# Patient Record
Sex: FEMALE | Race: BLACK OR AFRICAN AMERICAN | NOT HISPANIC OR LATINO | Employment: OTHER | URBAN - METROPOLITAN AREA
[De-identification: names, ages, dates, MRNs, and addresses within clinical notes are randomized per-mention and may not be internally consistent; named-entity substitution may affect disease eponyms.]

---

## 2017-01-09 DIAGNOSIS — I11.9 BENIGN HYPERTENSIVE HEART DISEASE WITHOUT CONGESTIVE HEART FAILURE: ICD-10-CM

## 2017-01-09 DIAGNOSIS — F32.2 MAJOR DEPRESSIVE DISORDER, SINGLE EPISODE, SEVERE WITHOUT PSYCHOTIC FEATURES: ICD-10-CM

## 2017-01-09 RX ORDER — METOPROLOL TARTRATE 100 MG/1
TABLET ORAL
Qty: 60 TABLET | Refills: 0 | Status: SHIPPED | OUTPATIENT
Start: 2017-01-09 | End: 2017-02-07 | Stop reason: SDUPTHER

## 2017-01-10 RX ORDER — PAROXETINE HYDROCHLORIDE 20 MG/1
TABLET, FILM COATED ORAL
Qty: 90 TABLET | Refills: 0 | Status: SHIPPED | OUTPATIENT
Start: 2017-01-10 | End: 2017-02-07 | Stop reason: SDUPTHER

## 2017-01-10 RX ORDER — GABAPENTIN 300 MG/1
CAPSULE ORAL
Qty: 180 CAPSULE | Refills: 0 | Status: SHIPPED | OUTPATIENT
Start: 2017-01-10

## 2017-02-06 DIAGNOSIS — I11.9 BENIGN HYPERTENSIVE HEART DISEASE WITHOUT HEART FAILURE: ICD-10-CM

## 2017-02-06 RX ORDER — AMLODIPINE BESYLATE 10 MG/1
10 TABLET ORAL DAILY
Qty: 30 TABLET | Refills: 0 | Status: SHIPPED | OUTPATIENT
Start: 2017-02-06 | End: 2017-02-07 | Stop reason: SDUPTHER

## 2017-02-07 ENCOUNTER — OFFICE VISIT (OUTPATIENT)
Dept: FAMILY MEDICINE | Facility: CLINIC | Age: 82
End: 2017-02-07
Payer: MEDICARE

## 2017-02-07 VITALS
BODY MASS INDEX: 29.86 KG/M2 | HEART RATE: 56 BPM | TEMPERATURE: 98 F | DIASTOLIC BLOOD PRESSURE: 70 MMHG | RESPIRATION RATE: 17 BRPM | SYSTOLIC BLOOD PRESSURE: 158 MMHG | HEIGHT: 67 IN | OXYGEN SATURATION: 97 % | WEIGHT: 190.25 LBS

## 2017-02-07 DIAGNOSIS — I63.9 ISCHEMIC CEREBROVASCULAR ACCIDENT (CVA) OF FRONTAL LOBE: ICD-10-CM

## 2017-02-07 DIAGNOSIS — F32.2 MAJOR DEPRESSIVE DISORDER, SINGLE EPISODE, SEVERE WITHOUT PSYCHOTIC FEATURES: ICD-10-CM

## 2017-02-07 DIAGNOSIS — E78.5 HYPERLIPIDEMIA, UNSPECIFIED HYPERLIPIDEMIA TYPE: ICD-10-CM

## 2017-02-07 DIAGNOSIS — N18.4 TYPE 2 DIABETES MELLITUS WITH STAGE 4 CHRONIC KIDNEY DISEASE, WITH LONG-TERM CURRENT USE OF INSULIN: ICD-10-CM

## 2017-02-07 DIAGNOSIS — E11.22 TYPE 2 DIABETES MELLITUS WITH STAGE 4 CHRONIC KIDNEY DISEASE, WITH LONG-TERM CURRENT USE OF INSULIN: ICD-10-CM

## 2017-02-07 DIAGNOSIS — N18.4 CHRONIC KIDNEY DISEASE, STAGE IV (SEVERE): Primary | ICD-10-CM

## 2017-02-07 DIAGNOSIS — Z79.4 TYPE 2 DIABETES MELLITUS WITH STAGE 4 CHRONIC KIDNEY DISEASE, WITH LONG-TERM CURRENT USE OF INSULIN: ICD-10-CM

## 2017-02-07 DIAGNOSIS — E03.9 HYPOTHYROIDISM, UNSPECIFIED TYPE: ICD-10-CM

## 2017-02-07 DIAGNOSIS — I11.9 BENIGN HYPERTENSIVE HEART DISEASE WITHOUT HEART FAILURE: ICD-10-CM

## 2017-02-07 DIAGNOSIS — I11.9 BENIGN HYPERTENSIVE HEART DISEASE WITHOUT CONGESTIVE HEART FAILURE: ICD-10-CM

## 2017-02-07 PROCEDURE — 1157F ADVNC CARE PLAN IN RCRD: CPT | Mod: S$GLB,,, | Performed by: FAMILY MEDICINE

## 2017-02-07 PROCEDURE — 1126F AMNT PAIN NOTED NONE PRSNT: CPT | Mod: S$GLB,,, | Performed by: FAMILY MEDICINE

## 2017-02-07 PROCEDURE — 1159F MED LIST DOCD IN RCRD: CPT | Mod: S$GLB,,, | Performed by: FAMILY MEDICINE

## 2017-02-07 PROCEDURE — 90662 IIV NO PRSV INCREASED AG IM: CPT | Mod: S$GLB,,, | Performed by: FAMILY MEDICINE

## 2017-02-07 PROCEDURE — 99499 UNLISTED E&M SERVICE: CPT | Mod: S$PBB,,, | Performed by: FAMILY MEDICINE

## 2017-02-07 PROCEDURE — 3078F DIAST BP <80 MM HG: CPT | Mod: S$GLB,,, | Performed by: FAMILY MEDICINE

## 2017-02-07 PROCEDURE — 90670 PCV13 VACCINE IM: CPT | Mod: S$GLB,,, | Performed by: FAMILY MEDICINE

## 2017-02-07 PROCEDURE — G0008 ADMIN INFLUENZA VIRUS VAC: HCPCS | Mod: S$GLB,,, | Performed by: FAMILY MEDICINE

## 2017-02-07 PROCEDURE — 99999 PR PBB SHADOW E&M-EST. PATIENT-LVL III: CPT | Mod: PBBFAC,,, | Performed by: FAMILY MEDICINE

## 2017-02-07 PROCEDURE — 99214 OFFICE O/P EST MOD 30 MIN: CPT | Mod: 25,S$GLB,, | Performed by: FAMILY MEDICINE

## 2017-02-07 PROCEDURE — 3077F SYST BP >= 140 MM HG: CPT | Mod: S$GLB,,, | Performed by: FAMILY MEDICINE

## 2017-02-07 PROCEDURE — 1160F RVW MEDS BY RX/DR IN RCRD: CPT | Mod: S$GLB,,, | Performed by: FAMILY MEDICINE

## 2017-02-07 PROCEDURE — G0009 ADMIN PNEUMOCOCCAL VACCINE: HCPCS | Mod: S$GLB,,, | Performed by: FAMILY MEDICINE

## 2017-02-07 RX ORDER — AMLODIPINE BESYLATE 10 MG/1
10 TABLET ORAL DAILY
Qty: 90 TABLET | Refills: 1 | Status: SHIPPED | OUTPATIENT
Start: 2017-02-07 | End: 2017-03-13 | Stop reason: ALTCHOICE

## 2017-02-07 RX ORDER — LEVOTHYROXINE SODIUM 50 UG/1
50 TABLET ORAL
Qty: 90 TABLET | Refills: 1 | Status: SHIPPED | OUTPATIENT
Start: 2017-02-07

## 2017-02-07 RX ORDER — METOPROLOL TARTRATE 100 MG/1
100 TABLET ORAL 2 TIMES DAILY
Qty: 180 TABLET | Refills: 1 | Status: SHIPPED | OUTPATIENT
Start: 2017-02-07 | End: 2017-04-05 | Stop reason: SDUPTHER

## 2017-02-07 RX ORDER — PAROXETINE HYDROCHLORIDE 20 MG/1
20 TABLET, FILM COATED ORAL EVERY MORNING
Qty: 90 TABLET | Refills: 1 | Status: SHIPPED | OUTPATIENT
Start: 2017-02-07

## 2017-02-07 RX ORDER — INSULIN GLARGINE 100 [IU]/ML
20 INJECTION, SOLUTION SUBCUTANEOUS NIGHTLY
Qty: 15 ML | Refills: 5 | Status: SHIPPED | OUTPATIENT
Start: 2017-02-07

## 2017-02-07 RX ORDER — PRAVASTATIN SODIUM 20 MG/1
20 TABLET ORAL DAILY
Qty: 90 TABLET | Refills: 1 | Status: SHIPPED | OUTPATIENT
Start: 2017-02-07

## 2017-02-07 RX ORDER — POTASSIUM CHLORIDE 750 MG/1
TABLET, EXTENDED RELEASE ORAL
Qty: 90 TABLET | Refills: 1 | Status: SHIPPED | OUTPATIENT
Start: 2017-02-07

## 2017-02-07 RX ORDER — VALSARTAN 160 MG/1
160 TABLET ORAL DAILY
Qty: 90 TABLET | Refills: 1 | Status: SHIPPED | OUTPATIENT
Start: 2017-02-07

## 2017-02-07 NOTE — PROGRESS NOTES
"Subjective:       Patient ID: Maria D Rodrigues is a 85 y.o. female.    Chief Complaint: Follow-up (hypertension)    Diabetes   She presents for her follow-up diabetic visit. She has type 2 diabetes mellitus. Her disease course has been stable. There are no hypoglycemic associated symptoms. Pertinent negatives for hypoglycemia include no dizziness or headaches. Pertinent negatives for diabetes include no chest pain and no fatigue. There are no hypoglycemic complications. Symptoms are stable. Current diabetic treatment includes insulin injections. She is following a generally unhealthy (eats small amounts but eats yaneth cake and cokes. ) diet. Her lunch blood glucose range is generally 180-200 mg/dl. An ACE inhibitor/angiotensin II receptor blocker is being taken. Eye exam is current.   KaterinaDidi Killian is pleasant elderly lady with insulin dependent diabetes, ckd, stage 4, hypertension , hypothyroidism,  goes between her home and her daughter, Marleni and her own home. She does give her own insulin, but her daughter in law comes to give shots sometimes. I have expressed concerns about this due to her dementia.         Review of Systems   Constitutional: Negative for fatigue.   Respiratory: Negative for cough, chest tightness and shortness of breath.    Cardiovascular: Negative for chest pain, palpitations and leg swelling.   Gastrointestinal: Negative for abdominal pain.   Neurological: Negative for dizziness, syncope, light-headedness and headaches.       Objective:       Vitals:    02/07/17 1110   BP: (!) 158/70   Pulse: (!) 56   Resp: 17   Temp: 98.1 °F (36.7 °C)   TempSrc: Oral   SpO2: 97%   Weight: 86.3 kg (190 lb 4.1 oz)   Height: 5' 7" (1.702 m)       Physical Exam   Constitutional: She is oriented to person, place, and time. She appears well-developed and well-nourished. No distress.   HENT:   Head: Normocephalic and atraumatic.   Eyes: Conjunctivae are normal.   Neck: Normal range of motion. Neck supple. Carotid " bruit is not present.   Cardiovascular: Normal rate, regular rhythm and normal heart sounds.  Exam reveals no gallop and no friction rub.    No murmur heard.  Pulmonary/Chest: Effort normal and breath sounds normal. No respiratory distress. She has no wheezes. She has no rales.   Musculoskeletal: She exhibits no edema.   Neurological: She is alert and oriented to person, place, and time.   Skin: She is not diaphoretic.       Assessment:       1. Chronic kidney disease, stage IV (severe)    2. Type 2 diabetes mellitus with stage 4 chronic kidney disease, with long-term current use of insulin    3. Ischemic cerebrovascular accident (CVA) of frontal lobe    4. Benign hypertensive heart disease without congestive heart failure    5. Benign hypertensive heart disease without heart failure    6. Major depressive disorder, single episode, severe without psychotic features    7. Hypothyroidism, unspecified type    8. Hyperlipidemia, unspecified hyperlipidemia type        Plan:       Maria D ZIMMERMAN was seen today for follow-up.    Diagnoses and all orders for this visit:    Chronic kidney disease, stage IV (severe)  -     Comprehensive metabolic panel; Future  -     TSH; Future  -     CBC auto differential; Future  -     PTH, intact; Future  -     Vitamin D; Future    Type 2 diabetes mellitus with stage 4 chronic kidney disease, with long-term current use of insulin  -     Comprehensive metabolic panel; Future  -     Hemoglobin A1c; Future  -     Lipid panel; Future  -     Microalbumin/creatinine urine ratio; Future  -     TSH; Future  -     insulin glargine (LANTUS SOLOSTAR) 100 unit/mL (3 mL) InPn pen; Inject 20 Units into the skin every evening.  Due for labs and refilled lantus. Will adjust as needed    Ischemic cerebrovascular accident (CVA) of frontal lobe  Stable. No new symptoms. On aspirin alone as she is a fall risk      Benign hypertensive heart disease without congestive heart failure  -     metoprolol tartrate  (LOPRESSOR) 100 MG tablet; Take 1 tablet (100 mg total) by mouth 2 (two) times daily.  Stable. Refilled meds.     Benign hypertensive heart disease without heart failure  -     valsartan (DIOVAN) 160 MG tablet; Take 1 tablet (160 mg total) by mouth once daily.  -     amlodipine (NORVASC) 10 MG tablet; Take 1 tablet (10 mg total) by mouth once daily.    Major depressive disorder, single episode, severe without psychotic features  -     paroxetine (PAXIL) 20 MG tablet; Take 1 tablet (20 mg total) by mouth every morning.  Stable. Refilled meds.     Hypothyroidism, unspecified type  -     levothyroxine (SYNTHROID) 50 MCG tablet; Take 1 tablet (50 mcg total) by mouth before breakfast.  Stable and controlled. Continue current treatment plan as previously prescribed.     Hyperlipidemia, unspecified hyperlipidemia type  -     pravastatin (PRAVACHOL) 20 MG tablet; Take 1 tablet (20 mg total) by mouth once daily.    Other orders  -     Influenza - High Dose (65+) (PF) (IM)  -     Pneumococcal Conjugate Vaccine (13 Valent) (IM)  -     potassium chloride SA (K-DUR,KLOR-CON) 10 MEQ tablet; TAKE ONE TABLET BY MOUTH ONCE DAILY FOR POTASSIUM

## 2017-03-01 ENCOUNTER — HOSPITAL ENCOUNTER (OUTPATIENT)
Facility: HOSPITAL | Age: 82
Discharge: HOME HEALTH | End: 2017-03-02
Attending: EMERGENCY MEDICINE | Admitting: HOSPITALIST
Payer: MEDICARE

## 2017-03-01 DIAGNOSIS — E03.9 HYPOTHYROIDISM, UNSPECIFIED TYPE: Chronic | ICD-10-CM

## 2017-03-01 DIAGNOSIS — E86.0 DEHYDRATION: ICD-10-CM

## 2017-03-01 DIAGNOSIS — E87.0 HYPERNATREMIA: ICD-10-CM

## 2017-03-01 DIAGNOSIS — E78.5 HYPERLIPIDEMIA, UNSPECIFIED HYPERLIPIDEMIA TYPE: Chronic | ICD-10-CM

## 2017-03-01 DIAGNOSIS — I11.9 BENIGN HYPERTENSIVE HEART DISEASE WITHOUT HEART FAILURE: ICD-10-CM

## 2017-03-01 DIAGNOSIS — N18.4 CKD (CHRONIC KIDNEY DISEASE), STAGE IV: Chronic | ICD-10-CM

## 2017-03-01 DIAGNOSIS — J10.1 INFLUENZA A: Primary | ICD-10-CM

## 2017-03-01 DIAGNOSIS — R41.82 ALTERED MENTAL STATUS, UNSPECIFIED ALTERED MENTAL STATUS TYPE: ICD-10-CM

## 2017-03-01 DIAGNOSIS — I10 HYPERTENSION: ICD-10-CM

## 2017-03-01 LAB
ALBUMIN SERPL BCP-MCNC: 3.5 G/DL
ALLENS TEST: ABNORMAL
ALP SERPL-CCNC: 126 U/L
ALT SERPL W/O P-5'-P-CCNC: 24 U/L
AMORPH CRY URNS QL MICRO: ABNORMAL
ANION GAP SERPL CALC-SCNC: 14 MMOL/L
AST SERPL-CCNC: 30 U/L
B-OH-BUTYR BLD STRIP-SCNC: 1.7 MMOL/L
BACTERIA #/AREA URNS HPF: ABNORMAL /HPF
BASOPHILS # BLD AUTO: 0.04 K/UL
BASOPHILS NFR BLD: 0.4 %
BILIRUB SERPL-MCNC: 0.6 MG/DL
BILIRUB UR QL STRIP: NEGATIVE
BUN SERPL-MCNC: 24 MG/DL
CALCIUM SERPL-MCNC: 10.3 MG/DL
CHLORIDE SERPL-SCNC: 106 MMOL/L
CLARITY UR: ABNORMAL
CO2 SERPL-SCNC: 27 MMOL/L
COLOR UR: YELLOW
CREAT SERPL-MCNC: 2.3 MG/DL
DIFFERENTIAL METHOD: ABNORMAL
EOSINOPHIL # BLD AUTO: 0.1 K/UL
EOSINOPHIL NFR BLD: 1.4 %
ERYTHROCYTE [DISTWIDTH] IN BLOOD BY AUTOMATED COUNT: 15.1 %
EST. GFR  (AFRICAN AMERICAN): 22 ML/MIN/1.73 M^2
EST. GFR  (NON AFRICAN AMERICAN): 19 ML/MIN/1.73 M^2
FLUAV AG SPEC QL IA: POSITIVE
FLUBV AG SPEC QL IA: NEGATIVE
GLUCOSE SERPL-MCNC: 473 MG/DL
GLUCOSE UR QL STRIP: ABNORMAL
HCO3 UR-SCNC: 27 MMOL/L (ref 24–28)
HCT VFR BLD AUTO: 44.8 %
HCT VFR BLD CALC: 42 %PCV (ref 36–54)
HGB BLD-MCNC: 15 G/DL
HGB UR QL STRIP: ABNORMAL
HYALINE CASTS #/AREA URNS LPF: 0 /LPF
KETONES UR QL STRIP: ABNORMAL
LEUKOCYTE ESTERASE UR QL STRIP: NEGATIVE
LIPASE SERPL-CCNC: 18 U/L
LYMPHOCYTES # BLD AUTO: 1.5 K/UL
LYMPHOCYTES NFR BLD: 16.5 %
MAGNESIUM SERPL-MCNC: 2.1 MG/DL
MCH RBC QN AUTO: 31.8 PG
MCHC RBC AUTO-ENTMCNC: 33.5 %
MCV RBC AUTO: 95 FL
MICROSCOPIC COMMENT: ABNORMAL
MONOCYTES # BLD AUTO: 1.4 K/UL
MONOCYTES NFR BLD: 15.3 %
NEUTROPHILS # BLD AUTO: 6.2 K/UL
NEUTROPHILS NFR BLD: 66.4 %
NITRITE UR QL STRIP: NEGATIVE
PCO2 BLDA: 42.9 MMHG (ref 35–45)
PH SMN: 7.41 [PH] (ref 7.35–7.45)
PH UR STRIP: 6 [PH] (ref 5–8)
PHOSPHATE SERPL-MCNC: 2.5 MG/DL
PLATELET # BLD AUTO: 215 K/UL
PMV BLD AUTO: 11.2 FL
PO2 BLDA: 30 MMHG (ref 40–60)
POC BE: 2 MMOL/L
POC IONIZED CALCIUM: 1.22 MMOL/L (ref 1.06–1.42)
POC SATURATED O2: 58 % (ref 95–100)
POC TCO2: 28 MMOL/L (ref 24–29)
POCT GLUCOSE: 387 MG/DL (ref 70–110)
POCT GLUCOSE: 391 MG/DL (ref 70–110)
POCT GLUCOSE: 396 MG/DL (ref 70–110)
POCT GLUCOSE: 400 MG/DL (ref 70–110)
POCT GLUCOSE: 495 MG/DL (ref 70–110)
POTASSIUM BLD-SCNC: 3.1 MMOL/L (ref 3.5–5.1)
POTASSIUM SERPL-SCNC: 3.2 MMOL/L
PROT SERPL-MCNC: 9.5 G/DL
PROT UR QL STRIP: ABNORMAL
RBC # BLD AUTO: 4.71 M/UL
RBC #/AREA URNS HPF: 2 /HPF (ref 0–4)
SAMPLE: ABNORMAL
SITE: ABNORMAL
SODIUM BLD-SCNC: 148 MMOL/L (ref 136–145)
SODIUM SERPL-SCNC: 147 MMOL/L
SP GR UR STRIP: 1.02 (ref 1–1.03)
SPECIMEN SOURCE: ABNORMAL
URN SPEC COLLECT METH UR: ABNORMAL
UROBILINOGEN UR STRIP-ACNC: NEGATIVE EU/DL
WBC # BLD AUTO: 9.32 K/UL
WBC #/AREA URNS HPF: 8 /HPF (ref 0–5)
WBC CLUMPS URNS QL MICRO: ABNORMAL
YEAST URNS QL MICRO: ABNORMAL

## 2017-03-01 PROCEDURE — 81000 URINALYSIS NONAUTO W/SCOPE: CPT

## 2017-03-01 PROCEDURE — 85025 COMPLETE CBC W/AUTO DIFF WBC: CPT

## 2017-03-01 PROCEDURE — 87400 INFLUENZA A/B EACH AG IA: CPT | Mod: 59

## 2017-03-01 PROCEDURE — 83690 ASSAY OF LIPASE: CPT

## 2017-03-01 PROCEDURE — 99291 CRITICAL CARE FIRST HOUR: CPT | Mod: 25

## 2017-03-01 PROCEDURE — 87088 URINE BACTERIA CULTURE: CPT

## 2017-03-01 PROCEDURE — 84100 ASSAY OF PHOSPHORUS: CPT

## 2017-03-01 PROCEDURE — 82803 BLOOD GASES ANY COMBINATION: CPT

## 2017-03-01 PROCEDURE — 25000003 PHARM REV CODE 250: Performed by: EMERGENCY MEDICINE

## 2017-03-01 PROCEDURE — 83735 ASSAY OF MAGNESIUM: CPT

## 2017-03-01 PROCEDURE — 25000003 PHARM REV CODE 250: Performed by: INTERNAL MEDICINE

## 2017-03-01 PROCEDURE — P9612 CATHETERIZE FOR URINE SPEC: HCPCS

## 2017-03-01 PROCEDURE — 87040 BLOOD CULTURE FOR BACTERIA: CPT | Mod: 59

## 2017-03-01 PROCEDURE — 63600175 PHARM REV CODE 636 W HCPCS: Performed by: EMERGENCY MEDICINE

## 2017-03-01 PROCEDURE — 87186 SC STD MICRODIL/AGAR DIL: CPT | Mod: 59

## 2017-03-01 PROCEDURE — G0378 HOSPITAL OBSERVATION PER HR: HCPCS

## 2017-03-01 PROCEDURE — 93005 ELECTROCARDIOGRAM TRACING: CPT

## 2017-03-01 PROCEDURE — 87086 URINE CULTURE/COLONY COUNT: CPT

## 2017-03-01 PROCEDURE — 96361 HYDRATE IV INFUSION ADD-ON: CPT

## 2017-03-01 PROCEDURE — 25000003 PHARM REV CODE 250: Performed by: PHYSICIAN ASSISTANT

## 2017-03-01 PROCEDURE — 63600175 PHARM REV CODE 636 W HCPCS: Performed by: INTERNAL MEDICINE

## 2017-03-01 PROCEDURE — 99900035 HC TECH TIME PER 15 MIN (STAT)

## 2017-03-01 PROCEDURE — 63600175 PHARM REV CODE 636 W HCPCS: Performed by: PHYSICIAN ASSISTANT

## 2017-03-01 PROCEDURE — 96374 THER/PROPH/DIAG INJ IV PUSH: CPT

## 2017-03-01 PROCEDURE — 87077 CULTURE AEROBIC IDENTIFY: CPT | Mod: 59

## 2017-03-01 PROCEDURE — 80053 COMPREHEN METABOLIC PANEL: CPT

## 2017-03-01 PROCEDURE — 82010 KETONE BODYS QUAN: CPT

## 2017-03-01 PROCEDURE — 82962 GLUCOSE BLOOD TEST: CPT

## 2017-03-01 RX ORDER — GLUCAGON 1 MG
1 KIT INJECTION
Status: DISCONTINUED | OUTPATIENT
Start: 2017-03-01 | End: 2017-03-02 | Stop reason: HOSPADM

## 2017-03-01 RX ORDER — SODIUM CHLORIDE 9 MG/ML
INJECTION, SOLUTION INTRAVENOUS CONTINUOUS
Status: DISCONTINUED | OUTPATIENT
Start: 2017-03-01 | End: 2017-03-02 | Stop reason: HOSPADM

## 2017-03-01 RX ORDER — OSELTAMIVIR PHOSPHATE 75 MG/1
75 CAPSULE ORAL
Status: COMPLETED | OUTPATIENT
Start: 2017-03-01 | End: 2017-03-01

## 2017-03-01 RX ORDER — OLANZAPINE 10 MG/2ML
10 INJECTION, POWDER, FOR SOLUTION INTRAMUSCULAR ONCE
Status: COMPLETED | OUTPATIENT
Start: 2017-03-01 | End: 2017-03-01

## 2017-03-01 RX ORDER — ONDANSETRON 2 MG/ML
4 INJECTION INTRAMUSCULAR; INTRAVENOUS EVERY 12 HOURS PRN
Status: DISCONTINUED | OUTPATIENT
Start: 2017-03-01 | End: 2017-03-02 | Stop reason: HOSPADM

## 2017-03-01 RX ORDER — ASPIRIN 81 MG/1
81 TABLET ORAL DAILY
Status: DISCONTINUED | OUTPATIENT
Start: 2017-03-02 | End: 2017-03-02 | Stop reason: HOSPADM

## 2017-03-01 RX ORDER — METOPROLOL TARTRATE 50 MG/1
100 TABLET ORAL 2 TIMES DAILY
Status: DISCONTINUED | OUTPATIENT
Start: 2017-03-01 | End: 2017-03-02 | Stop reason: HOSPADM

## 2017-03-01 RX ORDER — PAROXETINE HYDROCHLORIDE 20 MG/1
20 TABLET, FILM COATED ORAL EVERY MORNING
Status: DISCONTINUED | OUTPATIENT
Start: 2017-03-02 | End: 2017-03-02

## 2017-03-01 RX ORDER — LEVOTHYROXINE SODIUM 50 UG/1
50 TABLET ORAL
Status: DISCONTINUED | OUTPATIENT
Start: 2017-03-02 | End: 2017-03-02 | Stop reason: HOSPADM

## 2017-03-01 RX ORDER — CLONIDINE HYDROCHLORIDE 0.1 MG/1
0.1 TABLET ORAL 3 TIMES DAILY PRN
Status: DISCONTINUED | OUTPATIENT
Start: 2017-03-01 | End: 2017-03-02 | Stop reason: HOSPADM

## 2017-03-01 RX ORDER — ACETAMINOPHEN 325 MG/1
650 TABLET ORAL EVERY 6 HOURS PRN
Status: DISCONTINUED | OUTPATIENT
Start: 2017-03-01 | End: 2017-03-02 | Stop reason: HOSPADM

## 2017-03-01 RX ORDER — SODIUM CHLORIDE 9 MG/ML
1000 INJECTION, SOLUTION INTRAVENOUS
Status: COMPLETED | OUTPATIENT
Start: 2017-03-01 | End: 2017-03-01

## 2017-03-01 RX ORDER — OSELTAMIVIR PHOSPHATE 75 MG/1
75 CAPSULE ORAL DAILY
Status: DISCONTINUED | OUTPATIENT
Start: 2017-03-02 | End: 2017-03-02 | Stop reason: HOSPADM

## 2017-03-01 RX ORDER — INSULIN ASPART 100 [IU]/ML
1-12 INJECTION, SOLUTION INTRAVENOUS; SUBCUTANEOUS
Status: DISCONTINUED | OUTPATIENT
Start: 2017-03-02 | End: 2017-03-01

## 2017-03-01 RX ORDER — INSULIN ASPART 100 [IU]/ML
1-12 INJECTION, SOLUTION INTRAVENOUS; SUBCUTANEOUS
Status: DISCONTINUED | OUTPATIENT
Start: 2017-03-01 | End: 2017-03-02 | Stop reason: HOSPADM

## 2017-03-01 RX ORDER — AMLODIPINE BESYLATE 5 MG/1
10 TABLET ORAL DAILY
Status: DISCONTINUED | OUTPATIENT
Start: 2017-03-02 | End: 2017-03-02 | Stop reason: HOSPADM

## 2017-03-01 RX ORDER — ENOXAPARIN SODIUM 100 MG/ML
30 INJECTION SUBCUTANEOUS EVERY 24 HOURS
Status: DISCONTINUED | OUTPATIENT
Start: 2017-03-02 | End: 2017-03-02 | Stop reason: HOSPADM

## 2017-03-01 RX ADMIN — INSULIN DETEMIR 20 UNITS: 100 INJECTION, SOLUTION SUBCUTANEOUS at 09:03

## 2017-03-01 RX ADMIN — OLANZAPINE 10 MG: 10 INJECTION, POWDER, LYOPHILIZED, FOR SOLUTION INTRAMUSCULAR at 10:03

## 2017-03-01 RX ADMIN — OSELTAMIVIR PHOSPHATE 75 MG: 75 CAPSULE ORAL at 04:03

## 2017-03-01 RX ADMIN — SODIUM CHLORIDE 1000 ML: 0.9 INJECTION, SOLUTION INTRAVENOUS at 01:03

## 2017-03-01 RX ADMIN — METOPROLOL TARTRATE 100 MG: 50 TABLET ORAL at 09:03

## 2017-03-01 RX ADMIN — SODIUM CHLORIDE: 0.9 INJECTION, SOLUTION INTRAVENOUS at 06:03

## 2017-03-01 RX ADMIN — INSULIN ASPART 10 UNITS: 100 INJECTION, SOLUTION INTRAVENOUS; SUBCUTANEOUS at 09:03

## 2017-03-01 RX ADMIN — INSULIN HUMAN 6 UNITS: 100 INJECTION, SOLUTION PARENTERAL at 01:03

## 2017-03-01 NOTE — IP AVS SNAPSHOT
[address] [address] [place] [address]  Phone: [phone]           Patient Discharge Instructions     Our goal is to set you up for success. This packet includes information on your condition, medications, and your home care. It will help you to care for yourself so you don't get sicker and need to go back to the hospital.     Please ask your nurse if you have any questions.        There are many details to remember when preparing to leave the hospital. Here is what you will need to do:    1. Take your medicine. If you are prescribed medications, review your Medication List in the following pages. You may have new medications to  at the pharmacy and others that you'll need to stop taking. Review the instructions for how and when to take your medications. Talk with your doctor or nurses if you are unsure of what to do.     2. Go to your follow-up appointments. Specific follow-up information is listed in the following pages. Your may be contacted by a transition nurse or clinical provider about future appointments. Be sure we have all of the phone numbers to reach you, if needed. Please contact your provider's office if you are unable to make an appointment.     3. Watch for warning signs. Your doctor or nurse will give you detailed warning signs to watch for and when to call for assistance. These instructions may also include educational information about your condition. If you experience any of warning signs to your health, call your doctor.               [facility]  Unless otherwise directed by your provider, please contact [facility], our nurse care line that is available for 24/7 assistance.     [phone] (toll-free)    Registered nurses in the [facility] provide clinical advisement, health education, appointment booking, and other advisory services.                    ** Verify the list of medication(s) below is accurate and up to date.  "Carry this with you in case of emergency. If your medications have changed, please notify your healthcare provider.             Medication List      START taking these medications        Additional Info                      amoxicillin-clavulanate 250-125mg 250-125 mg Tab   Commonly known as:  AUGMENTIN   Quantity:  14 tablet   Refills:  0   Dose:  1 tablet   Indications:  Urinary Tract Infection    Instructions:  Take 1 tablet by mouth every 12 (twelve) hours.     Begin Date    AM    Noon    PM    Bedtime       oseltamivir 75 MG capsule   Commonly known as:  TAMIFLU   Quantity:  5 capsule   Refills:  0   Dose:  75 mg    Last time this was given:  75 mg on 3/2/2017  8:54 AM   Instructions:  Take 1 capsule (75 mg total) by mouth once daily.     Begin Date    AM    Noon    PM    Bedtime         CONTINUE taking these medications        Additional Info                      amlodipine 10 MG tablet   Commonly known as:  NORVASC   Quantity:  90 tablet   Refills:  1   Dose:  10 mg    Last time this was given:  10 mg on 3/2/2017  8:53 AM   Instructions:  Take 1 tablet (10 mg total) by mouth once daily.     Begin Date    AM    Noon    PM    Bedtime       aspirin 81 MG EC tablet   Commonly known as:  ECOTRIN   Refills:  0   Dose:  1 tablet    Last time this was given:  81 mg on 3/2/2017  8:54 AM   Instructions:  Take 1 tablet by mouth Daily.     Begin Date    AM    Noon    PM    Bedtime       BD ULTRA-FINE LILIAN PEN NEEDLES 32 gauge x 5/32" Ndle   Quantity:  400 each   Refills:  5   Generic drug:  pen needle, diabetic    Instructions:  use with insulin FOUR TIMES DAILY     Begin Date    AM    Noon    PM    Bedtime       CALTRATE 600 + D 600 mg(1,500mg) -400 unit Chew   Refills:  0   Dose:  1 tablet   Generic drug:  calcium carbonate-vitamin D3    Instructions:  Take 1 tablet by mouth once daily.     Begin Date    AM    Noon    PM    Bedtime       diaper,brief,adult,disposable Misc   Quantity:  100 each   Refills:  11    " Instructions:  Change 5 times per day     Begin Date    AM    Noon    PM    Bedtime       FISH -160-1,000 mg Cap   Refills:  0   Dose:  1 tablet   Generic drug:  omega 3-dha-epa-fish oil    Instructions:  Take 1 tablet by mouth Daily.     Begin Date    AM    Noon    PM    Bedtime       gabapentin 300 MG capsule   Commonly known as:  NEURONTIN   Quantity:  180 capsule   Refills:  0    Last time this was given:  300 mg on 3/2/2017  8:54 AM   Instructions:  TAKE ONE TABLET BY MOUTH TWICE DAILY     Begin Date    AM    Noon    PM    Bedtime       insulin glargine 100 unit/mL (3 mL) Inpn pen   Commonly known as:  LANTUS SOLOSTAR   Quantity:  15 mL   Refills:  5   Dose:  20 Units    Instructions:  Inject 20 Units into the skin every evening.     Begin Date    AM    Noon    PM    Bedtime       levothyroxine 50 MCG tablet   Commonly known as:  SYNTHROID   Quantity:  90 tablet   Refills:  1   Dose:  50 mcg   Comments:  Need follow up to discuss labs    Last time this was given:  50 mcg on 3/2/2017  5:56 AM   Instructions:  Take 1 tablet (50 mcg total) by mouth before breakfast.     Begin Date    AM    Noon    PM    Bedtime       metoprolol tartrate 100 MG tablet   Commonly known as:  LOPRESSOR   Quantity:  180 tablet   Refills:  1   Dose:  100 mg    Last time this was given:  100 mg on 3/2/2017  8:53 AM   Instructions:  Take 1 tablet (100 mg total) by mouth 2 (two) times daily.     Begin Date    AM    Noon    PM    Bedtime       NOVOLOG FLEXPEN 100 unit/mL Inpn pen   Quantity:  45 mL   Refills:  0   Generic drug:  insulin aspart    Last time this was given:  4 Units on 3/2/2017  8:55 AM   Instructions:  INJECT 15 UNITS UNDER THE SKIN THREE TIMES DAILY WITH MEALS     Begin Date    AM    Noon    PM    Bedtime       paroxetine 20 MG tablet   Commonly known as:  PAXIL   Quantity:  90 tablet   Refills:  1   Dose:  20 mg    Last time this was given:  20 mg on 3/2/2017  8:53 AM   Instructions:  Take 1 tablet (20 mg total) by  mouth every morning.     Begin Date    AM    Noon    PM    Bedtime       potassium chloride SA 10 MEQ tablet   Commonly known as:  K-DUR,KLOR-CON   Quantity:  90 tablet   Refills:  1   Comments:  This prescription was filled today(9/7/2016). Any refills authorized will be placed on file.    Instructions:  TAKE ONE TABLET BY MOUTH ONCE DAILY FOR POTASSIUM     Begin Date    AM    Noon    PM    Bedtime       pravastatin 20 MG tablet   Commonly known as:  PRAVACHOL   Quantity:  90 tablet   Refills:  1   Dose:  20 mg   Comments:  This prescription was filled today. Any refills authorized will be placed on file.    Last time this was given:  20 mg on 3/2/2017  8:53 AM   Instructions:  Take 1 tablet (20 mg total) by mouth once daily.     Begin Date    AM    Noon    PM    Bedtime       SURE COMFORT INSULIN SYRINGE 0.5 mL 31 gauge x 5/16 Syrg   Quantity:  400 each   Refills:  12   Generic drug:  insulin syringe-needle U-100    Instructions:  USE ALONG WITH INSULIN FOUR TIMES DAILY     Begin Date    AM    Noon    PM    Bedtime       valsartan 160 MG tablet   Commonly known as:  DIOVAN   Quantity:  90 tablet   Refills:  1   Dose:  160 mg    Last time this was given:  160 mg on 3/2/2017  8:53 AM   Instructions:  Take 1 tablet (160 mg total) by mouth once daily.     Begin Date    AM    Noon    PM    Bedtime       walker Misc   Commonly known as:  ULTRA-LIGHT ROLLATOR   Quantity:  1 each   Refills:  0   Dose:  1 Units    Instructions:  1 Units by Misc.(Non-Drug; Combo Route) route once daily.     Begin Date    AM    Noon    PM    Bedtime         STOP taking these medications     clotrimazole-betamethasone 1-0.05% cream   Commonly known as:  LOTRISONE       metoprolol succinate 50 MG 24 hr tablet   Commonly known as:  TOPROL-XL         ASK your doctor about these medications        Additional Info                      lancets 30 gauge Misc   Quantity:  150 each   Refills:  12   Dose:  1 lancet    Instructions:  Inject 1 lancet as  directed 4 (four) times daily.     Begin Date    AM    Noon    PM    Bedtime            Where to Get Your Medications      These medications were sent to [facility] - [place], [place] - [address]  [address] [address], [place] [address]     Phone:  [phone]     amoxicillin-clavulanate 250-125mg 250-125 mg Tab    oseltamivir 75 MG capsule                  Please bring to all follow up appointments:    1. A copy of your discharge instructions.  2. All medicines you are currently taking in their original bottles.  3. Identification and insurance card.    Please arrive 15 minutes ahead of scheduled appointment time.    Please call 24 hours in advance if you must reschedule your appointment and/or time.        Your Scheduled Appointments     Mar 07, 2017  2:00 PM [facility]   Hospital Follow Up with MD [place] Solorio - Family Medicine ([place])    [address]  [facility]  [place] [id number] [phone]              Follow-up Information     Follow up with Carla Guerrero MD On 3/7/2017.    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      Admission Information     Date & Time Provider Department [facility]    3/1/2017 10:56 AM Lisa Toledo MD [facility] [id number]      Care Providers     Provider Role Specialty Primary office phone    Lisa Toledo MD Attending Provider Hospitalist [phone]      Your Vitals  Were     BP                   165/77 (BP Location: Left arm, Patient Position: Lying, BP Method: Automatic)           Recent Lab Values        2/19/2014 9/3/2014 2/11/2015 3/28/2015 9/4/2015 3/30/2016 7/11/2016 8/27/2016      8:30 AM  9:27 AM  8:30 AM  4:50 PM 11:22 AM  8:30 AM 10:50 AM  5:50 PM    A1C 8.6 (H) 7.2 (H) 7.0 (H) 6.6 (H) 12.2 (H) 11.6 (H) 11.4 (H) 12.2 (H)    Comment for A1C at 10:50 AM on 7/11/2016:  According to ADA guidelines, hemoglobin A1C <7.0% represents  optimal control in non-pregnant diabetic patients.  Different  metrics may apply to specific populations.   Standards of Medical Care in Diabetes - 2016.  For the purpose of screening for the presence of diabetes:  <5.7%     Consistent with the absence of diabetes  5.7-6.4%  Consistent with increasing risk for diabetes   (prediabetes)  >or=6.5%  Consistent with diabetes  Currently no consensus exists for use of hemoglobin A1C  for diagnosis of diabetes for children.      Comment for A1C at  5:50 PM on 8/27/2016:  According to ADA guidelines, hemoglobin A1C <7.0% represents  optimal control in non-pregnant diabetic patients.  Different  metrics may apply to specific populations.   Standards of Medical Care in Diabetes - 2016.  For the purpose of screening for the presence of diabetes:  <5.7%     Consistent with the absence of diabetes  5.7-6.4%  Consistent with increasing risk for diabetes   (prediabetes)  >or=6.5%  Consistent with diabetes  Currently no consensus exists for use of hemoglobin A1C  for diagnosis of diabetes for children.        Pending Labs     Order Current Status    Blood Culture #1 **CANNOT BE ORDERED STAT** Preliminary result    Blood Culture #2 **CANNOT BE ORDERED STAT** Preliminary result    Urine culture **CANNOT BE ORDERED STAT** Preliminary result      Allergies as of 3/2/2017     No Known Allergies      Advance Directives     An advance directive is a document which, in the event you are no longer able to make decisions  for yourself, tells your healthcare team what kind of treatment you do or do not want to receive, or who you would like to make those decisions for you.  If you do not currently have an advance directive, [facility] encourages you to create one.  For more information call:  [phone]), [phone] ([phone]),  or log on to [email or web address].        Smoking Cessation     If you would like to quit smoking:   You may be eligible for free services if you are a [place] resident and started smoking cigarettes before September 1, 1988.  Call the Smoking Cessation Trust ([facility]) toll free at (585) 597-7137 or (307) 240-2859.   Call 0-441-QUIT-NOW if you do not meet the above criteria.            Language Assistance Services     ATTENTION: Language assistance services are available, free of charge. Please call [phone].      ATENCIÓN: Si habla español, tiene a pichardo disposición servicios gratuitos de asistencia lingüística. Llame al [phone].     CHÚ Ý: N?u b?n nói Ti?ng Vi?t, có các d?ch v? h? tr? ngôn ng? mi?n phí dành cho b?n. G?i s? [phone].        Stroke Education              Chronic Kindey Disease Education             [facility] Sign-Up     Activating your [facility] account is as easy as 1-2-3!     1) Visit [email or web address], select Sign Up Now, enter this activation code and your date of birth, then select Next.  [email or web address]  Expires: 4/16/2017 11:45 AM      2) Create a username and password to use when you visit [facility] in the future and select a security question in case you lose your password and select Next.    3) Enter your e-mail address and click Sign Up!    Additional Information  If you have questions, please e-mail [email or web address] or call [phone] to talk to our [facility] staff. Remember, [facility] is NOT to be used for urgent needs. For medical emergencies, dial 911.          [facility] complies with applicable Federal civil  rights laws and does not discriminate on the basis of race, color, national origin, age, disability, or sex.

## 2017-03-01 NOTE — ED PROVIDER NOTES
Encounter Date: 3/1/2017    SCRIBE #1 NOTE: I, Yolie Hernandez, am scribing for, and in the presence of,  Cordell Oleary III, MD. I have scribed the following portions of the note - Other sections scribed: ROS and HPI.       History     Chief Complaint   Patient presents with    Hyperglycemia     via ems pt's family checked pt's CBG and it read HIGH. CBG by [place] 402,  unable to start IV PTA.     Altered Mental Status     per family pt was taking off clothes and disoriented. Pt was incontinent of stool. Not her baseline.      Review of patient's allergies indicates:  No Known Allergies  HPI Comments: CC: Hyperglycemia; Altered Mental Status    HPI: This 85 y.o. female with a past medical history of CKD stage 4, Degenerative joint disease involving multiple joints; Diabetes mellitus type II; Diabetes mellitus with neuropathy; HTN Hyperlipidemia; Stroke; Thyroid disease; and Thyroid nodule, presents to the ED via EMS accompanied by a family member c/o hyperglycemia and an altered mental status since this morning. EMS noted CBG of 402. Family member states that patient was found confused, stripping her clothes, and had a loose BM on herself. She states pt is more sleepy than usual. She reports the pt's L eye is closed and has redness. Family member states the pt was ambulatory, acting her normal self last week. Pt reportedly had similar sx with her UTI episode in past. No recent illnesses are reported. Otherwise, the pt hx is limited due to her mental status change.     The history is provided by the patient. No  was used.     Past Medical History:   Diagnosis Date    Chronic kidney disease     CKD (chronic kidney disease) stage 4, GFR 15-29 ml/min     Degenerative joint disease involving multiple joints     Diabetes mellitus type II     insulin dependent    Diabetes mellitus with neuropathy     DNR (do not resuscitate)     HTN (hypertension)     Hyperlipidemia     Stroke     Thyroid  disease     hypothyroidism    Thyroid nodule      Past Surgical History:   Procedure Laterality Date    APPENDECTOMY      BACK SURGERY      CATARACT EXTRACTION, BILATERAL       SECTION      CHOLECYSTECTOMY       Family History   Problem Relation Age of Onset    Learning disabilities Sister     Diabetes Mother      Social History   Substance Use Topics    Smoking status: Former Smoker     Quit date: 1956    Smokeless tobacco: None    Alcohol use No     Review of Systems   Unable to perform ROS: Mental status change       Physical Exam   Initial Vitals   BP Pulse Resp Temp SpO2   17 1015 17 1015 17 1015 17 1015 17 1015   186/89 104 18 98.7 °F (37.1 °C) 98 %     Physical Exam    Nursing note and vitals reviewed.  Constitutional: She appears well-developed and well-nourished. She is not diaphoretic. No distress.   HENT:   Head: Normocephalic and atraumatic.   Nose: Nose normal.   Mouth/Throat: Oropharynx is clear and moist. No oropharyngeal exudate.   Eyes: Conjunctivae and EOM are normal. Pupils are equal, round, and reactive to light. No scleral icterus.   Neck: Normal range of motion. Neck supple. No thyromegaly present. No tracheal deviation present.   Cardiovascular: Regular rhythm and normal heart sounds. Exam reveals no gallop and no friction rub.    No murmur heard.  Tachycardic   Pulmonary/Chest: Breath sounds normal. No respiratory distress. She has no wheezes. She has no rhonchi. She has no rales.   Abdominal: Soft. Bowel sounds are normal. She exhibits no distension and no mass. There is no tenderness. There is no rebound and no guarding.   Musculoskeletal: Normal range of motion. She exhibits no edema or tenderness.   Lymphadenopathy:     She has no cervical adenopathy.   Neurological: She is alert. She has normal strength. No cranial nerve deficit or sensory deficit.   Answering simple questions appropriately   Skin: Skin is warm and dry. No rash noted.  No erythema. No pallor.   Psychiatric: She has a normal mood and affect. Her behavior is normal. Thought content normal.         ED Course   Procedures  Labs Reviewed   CBC W/ AUTO DIFFERENTIAL - Abnormal; Notable for the following:        Result Value    MCH 31.8 (*)     RDW 15.1 (*)     Mono # 1.4 (*)     Lymph% 16.5 (*)     Mono% 15.3 (*)     All other components within normal limits   COMPREHENSIVE METABOLIC PANEL - Abnormal; Notable for the following:     Sodium 147 (*)     Potassium 3.2 (*)     Glucose 473 (*)     BUN, Bld 24 (*)     Creatinine 2.3 (*)     Total Protein 9.5 (*)     eGFR if  22 (*)     eGFR if non  19 (*)     All other components within normal limits    Narrative:      Glucose  critical result(s) called and verbal readback obtained from   Marva bardales, 03/01/2017 12:35   PHOSPHORUS - Abnormal; Notable for the following:     Phosphorus 2.5 (*)     All other components within normal limits   BETA - HYDROXYBUTYRATE, SERUM - Abnormal; Notable for the following:     Beta-Hydroxybutyrate 1.7 (*)     All other components within normal limits   URINALYSIS - Abnormal; Notable for the following:     Appearance, UA Hazy (*)     Protein, UA 3+ (*)     Glucose, UA 3+ (*)     Ketones, UA 1+ (*)     Occult Blood UA 2+ (*)     All other components within normal limits   URINALYSIS MICROSCOPIC - Abnormal; Notable for the following:     WBC, UA 8 (*)     WBC Clumps, UA Occasional (*)     Bacteria, UA Many (*)     All other components within normal limits   POCT GLUCOSE - Abnormal; Notable for the following:     POCT Glucose 400 (*)     All other components within normal limits   CULTURE, URINE   CULTURE, BLOOD   CULTURE, BLOOD   LIPASE   MAGNESIUM   INFLUENZA A AND B ANTIGEN             Medical Decision Making:   Initial Assessment:   85-year-old female with a history of chronic renal insufficiency, diabetes, brought in by EMS for hyperglycemia and altered mental status as  "detailed above.  Patient was reportedly incontinent of diarrhea, which is abnormal for her.  On physical examination the patient is answering simple questions but does seem somewhat listless.  Her daughter reports that she is usually awake and alert, conversational, "feisty", and that the patient seems much more sleepy than normal.  Physical examination reveals dry cracked lips, dry mucous membranes, tachycardia, left eye conjunctivitis.   Differential Diagnosis:   Dehydration, DKA, HHS, electrolyte abnormality, renal insufficiency, viral syndrome  Independently Interpreted Test(s):   I have ordered and independently interpreted X-rays - see summary below.       <> Summary of X-Ray Reading(s): Chest x-ray: No acute abnormality    CT head: No acute abnormality      I have ordered and independently interpreted EKG Reading(s) - see summary below       <> Summary of EKG Reading(s): Sinus tachycardia at 102 bpm, left axis deviation, normal intervals, no acute ischemic changes  ED Management:  Patient's workup reveals hyperglycemia, hypernatremia, hypokalemia, creatinine 2.3, influenza A positive.  The patient's urinalysis is equivocal, with 8 white blood cells, many bacteria, but negative leukocyte esterase and negative nitrite.     Patient's altered mental status and listlessness likely the result of dehydration, mild electrolyte abnormalities, hypoglycemia, and influenza.  Given age, multiple comorbid conditions, will admit for IV fluids, Tamiflu, repeat labs.             Scribe Attestation:   Scribe #1: I performed the above scribed service and the documentation accurately describes the services I performed. I attest to the accuracy of the note.    Attending Attestation:           Physician Attestation for Scribe:  Physician Attestation Statement for Scribe #1: I, Cordell Oleary III, MD, reviewed documentation, as scribed by Yolie Hernandez in my presence, and it is both accurate and complete.                 ED Course "     Clinical Impression:   The primary encounter diagnosis was Influenza A. Diagnoses of Hypertension, Dehydration, Hypernatremia, and Altered mental status, unspecified altered mental status type were also pertinent to this visit.          Cordell Oleary III, MD  03/01/17 2031

## 2017-03-01 NOTE — ED TRIAGE NOTES
Pt arrives per ems to ED c/o elevated blood sugar and some confusion.  Family doesn't know exactly when her blood sugar started to run high.  Yesterday was really the first day she seemed confused by other family members.

## 2017-03-02 VITALS
BODY MASS INDEX: 27.53 KG/M2 | DIASTOLIC BLOOD PRESSURE: 74 MMHG | RESPIRATION RATE: 18 BRPM | HEART RATE: 50 BPM | HEIGHT: 66 IN | TEMPERATURE: 98 F | OXYGEN SATURATION: 93 % | WEIGHT: 171.31 LBS | SYSTOLIC BLOOD PRESSURE: 176 MMHG

## 2017-03-02 LAB
ANION GAP SERPL CALC-SCNC: 11 MMOL/L
BASOPHILS # BLD AUTO: 0.09 K/UL
BASOPHILS NFR BLD: 1 %
BUN SERPL-MCNC: 23 MG/DL
C DIFF GDH STL QL: NEGATIVE
C DIFF TOX A+B STL QL IA: NEGATIVE
CALCIUM SERPL-MCNC: 9.5 MG/DL
CHLORIDE SERPL-SCNC: 118 MMOL/L
CO2 SERPL-SCNC: 22 MMOL/L
CREAT SERPL-MCNC: 1.9 MG/DL
DIFFERENTIAL METHOD: ABNORMAL
EOSINOPHIL # BLD AUTO: 0.2 K/UL
EOSINOPHIL NFR BLD: 1.9 %
ERYTHROCYTE [DISTWIDTH] IN BLOOD BY AUTOMATED COUNT: 15.1 %
EST. GFR  (AFRICAN AMERICAN): 27 ML/MIN/1.73 M^2
EST. GFR  (NON AFRICAN AMERICAN): 24 ML/MIN/1.73 M^2
GLUCOSE SERPL-MCNC: 168 MG/DL
HCT VFR BLD AUTO: 40.8 %
HGB BLD-MCNC: 13.1 G/DL
LYMPHOCYTES # BLD AUTO: 2.3 K/UL
LYMPHOCYTES NFR BLD: 25.1 %
MCH RBC QN AUTO: 30.5 PG
MCHC RBC AUTO-ENTMCNC: 32.1 %
MCV RBC AUTO: 95 FL
MONOCYTES # BLD AUTO: 1.9 K/UL
MONOCYTES NFR BLD: 20.6 %
NEUTROPHILS # BLD AUTO: 4.7 K/UL
NEUTROPHILS NFR BLD: 51.2 %
PLATELET # BLD AUTO: 182 K/UL
PMV BLD AUTO: 10.3 FL
POCT GLUCOSE: 202 MG/DL (ref 70–110)
POCT GLUCOSE: 270 MG/DL (ref 70–110)
POTASSIUM SERPL-SCNC: 3.7 MMOL/L
RBC # BLD AUTO: 4.29 M/UL
SODIUM SERPL-SCNC: 151 MMOL/L
WBC # BLD AUTO: 9.16 K/UL

## 2017-03-02 PROCEDURE — 25000003 PHARM REV CODE 250: Performed by: EMERGENCY MEDICINE

## 2017-03-02 PROCEDURE — 96372 THER/PROPH/DIAG INJ SC/IM: CPT

## 2017-03-02 PROCEDURE — 96361 HYDRATE IV INFUSION ADD-ON: CPT

## 2017-03-02 PROCEDURE — 25000003 PHARM REV CODE 250: Performed by: PHYSICIAN ASSISTANT

## 2017-03-02 PROCEDURE — 36415 COLL VENOUS BLD VENIPUNCTURE: CPT

## 2017-03-02 PROCEDURE — 85025 COMPLETE CBC W/AUTO DIFF WBC: CPT

## 2017-03-02 PROCEDURE — 63600175 PHARM REV CODE 636 W HCPCS: Performed by: PHYSICIAN ASSISTANT

## 2017-03-02 PROCEDURE — 80048 BASIC METABOLIC PNL TOTAL CA: CPT

## 2017-03-02 PROCEDURE — G0378 HOSPITAL OBSERVATION PER HR: HCPCS

## 2017-03-02 PROCEDURE — 87449 NOS EACH ORGANISM AG IA: CPT

## 2017-03-02 PROCEDURE — 96360 HYDRATION IV INFUSION INIT: CPT

## 2017-03-02 PROCEDURE — 25000003 PHARM REV CODE 250: Performed by: INTERNAL MEDICINE

## 2017-03-02 RX ORDER — NYSTATIN 100000 [USP'U]/ML
500000 SUSPENSION ORAL
Status: DISCONTINUED | OUTPATIENT
Start: 2017-03-02 | End: 2017-03-02 | Stop reason: HOSPADM

## 2017-03-02 RX ORDER — AMOXICILLIN AND CLAVULANATE POTASSIUM 250; 125 MG/1; MG/1
1 TABLET, FILM COATED ORAL EVERY 8 HOURS
Status: DISCONTINUED | OUTPATIENT
Start: 2017-03-02 | End: 2017-03-02

## 2017-03-02 RX ORDER — OSELTAMIVIR PHOSPHATE 75 MG/1
75 CAPSULE ORAL DAILY
Qty: 5 CAPSULE | Refills: 0 | Status: SHIPPED | OUTPATIENT
Start: 2017-03-02 | End: 2017-03-07

## 2017-03-02 RX ORDER — GABAPENTIN 300 MG/1
300 CAPSULE ORAL 2 TIMES DAILY
Status: DISCONTINUED | OUTPATIENT
Start: 2017-03-02 | End: 2017-03-02 | Stop reason: HOSPADM

## 2017-03-02 RX ORDER — PRAVASTATIN SODIUM 10 MG/1
20 TABLET ORAL DAILY
Status: DISCONTINUED | OUTPATIENT
Start: 2017-03-02 | End: 2017-03-02 | Stop reason: HOSPADM

## 2017-03-02 RX ORDER — PAROXETINE HYDROCHLORIDE 20 MG/1
20 TABLET, FILM COATED ORAL DAILY
Status: DISCONTINUED | OUTPATIENT
Start: 2017-03-02 | End: 2017-03-02 | Stop reason: HOSPADM

## 2017-03-02 RX ORDER — AMOXICILLIN AND CLAVULANATE POTASSIUM 250; 125 MG/1; MG/1
1 TABLET, FILM COATED ORAL EVERY 12 HOURS
Status: DISCONTINUED | OUTPATIENT
Start: 2017-03-02 | End: 2017-03-02 | Stop reason: HOSPADM

## 2017-03-02 RX ORDER — VALSARTAN 80 MG/1
160 TABLET ORAL DAILY
Status: DISCONTINUED | OUTPATIENT
Start: 2017-03-02 | End: 2017-03-02 | Stop reason: HOSPADM

## 2017-03-02 RX ORDER — AMOXICILLIN AND CLAVULANATE POTASSIUM 250; 125 MG/1; MG/1
1 TABLET, FILM COATED ORAL EVERY 12 HOURS
Qty: 14 TABLET | Refills: 0 | Status: SHIPPED | OUTPATIENT
Start: 2017-03-02 | End: 2017-03-09

## 2017-03-02 RX ORDER — INSULIN ASPART 100 [IU]/ML
10 INJECTION, SOLUTION INTRAVENOUS; SUBCUTANEOUS
Status: DISCONTINUED | OUTPATIENT
Start: 2017-03-02 | End: 2017-03-02 | Stop reason: HOSPADM

## 2017-03-02 RX ADMIN — INSULIN ASPART 6 UNITS: 100 INJECTION, SOLUTION INTRAVENOUS; SUBCUTANEOUS at 11:03

## 2017-03-02 RX ADMIN — GABAPENTIN 300 MG: 300 CAPSULE ORAL at 08:03

## 2017-03-02 RX ADMIN — SODIUM CHLORIDE: 0.9 INJECTION, SOLUTION INTRAVENOUS at 05:03

## 2017-03-02 RX ADMIN — VALSARTAN 160 MG: 80 TABLET, FILM COATED ORAL at 08:03

## 2017-03-02 RX ADMIN — INSULIN ASPART 4 UNITS: 100 INJECTION, SOLUTION INTRAVENOUS; SUBCUTANEOUS at 08:03

## 2017-03-02 RX ADMIN — AMLODIPINE BESYLATE 10 MG: 5 TABLET ORAL at 08:03

## 2017-03-02 RX ADMIN — CLONIDINE HYDROCHLORIDE 0.1 MG: 0.1 TABLET ORAL at 05:03

## 2017-03-02 RX ADMIN — LEVOTHYROXINE SODIUM 50 MCG: 50 TABLET ORAL at 05:03

## 2017-03-02 RX ADMIN — AMOXICILLIN AND CLAVULANATE POTASSIUM 1 TABLET: 250; 125 TABLET, FILM COATED ORAL at 11:03

## 2017-03-02 RX ADMIN — LOPERAMIDE HYDROCHLORIDE 2 MG: 1 SOLUTION ORAL at 11:03

## 2017-03-02 RX ADMIN — OSELTAMIVIR PHOSPHATE 75 MG: 75 CAPSULE ORAL at 08:03

## 2017-03-02 RX ADMIN — PAROXETINE HYDROCHLORIDE HEMIHYDRATE 20 MG: 20 TABLET, FILM COATED ORAL at 08:03

## 2017-03-02 RX ADMIN — PRAVASTATIN SODIUM 20 MG: 10 TABLET ORAL at 08:03

## 2017-03-02 RX ADMIN — SODIUM CHLORIDE: 0.9 INJECTION, SOLUTION INTRAVENOUS at 08:03

## 2017-03-02 RX ADMIN — METOPROLOL TARTRATE 100 MG: 50 TABLET ORAL at 08:03

## 2017-03-02 RX ADMIN — INSULIN ASPART 10 UNITS: 100 INJECTION, SOLUTION INTRAVENOUS; SUBCUTANEOUS at 11:03

## 2017-03-02 RX ADMIN — NYSTATIN 500000 UNITS: 500000 SUSPENSION ORAL at 11:03

## 2017-03-02 RX ADMIN — ASPIRIN 81 MG: 81 TABLET, COATED ORAL at 08:03

## 2017-03-02 NOTE — NURSING
Pt received from ED assisted by ED nurse via stretcher. Assisted to bed and bed locked, lowered, SR up x2. Vitals obtained and documented. Tele monitor initiated. Pt is AAO x4. Resp are even, unlabored, diminished on room air. Pt is presenting SR on tele monitor. Abd is rounded, obese, active x4 quads and reports last bowel movement 3/1. Pt is incontinent as of now. Skin is c/d/i. 22g PIV to LW infusing NS @125ml/hr. Pt denied pain at current time and is in NAD. Assessment completed and documented. See doc flow sheet. Plan of care reviewed with pt and pt verbalized understanding. Call light placed within reach. Will continue to monitor pt closely.

## 2017-03-02 NOTE — PLAN OF CARE
[facility]    HOME HEALTH ORDERS  FACE TO FACE ENCOUNTER    Patient Name: Maria D Rodrigues  YOB: 1931    PCP: Carla Guerrero MD   PCP Address: [address] / [place] [address]  PCP Phone Number: [phone]  PCP Fax: [phone]       Encounter Date: 03/02/2017    Admit to Home Health    Diagnoses:  Active Hospital Problems    Diagnosis  POA    *Type 2 diabetes mellitus, uncontrolled, with renal complications [E11.29, E11.65]  Yes     Chronic     insulin dependent      Influenza A [J10.1]  Yes    CKD Stage IV [N18.4]  Yes     Chronic    Hypertension [I10]  Yes     Chronic    Hyperlipidemia [E78.5]  Yes     Chronic    Hypothyroidism [E03.9]  Yes     Chronic     hypothyroidism        Resolved Hospital Problems    Diagnosis Date Resolved POA   No resolved problems to display.       No future appointments.        I have seen and examined this patient face to face today. My clinical findings that support the need for the home health skilled services and home bound status are the following:  Mental confusion making it unsafe for patient to leave home alone due to  Dementia.    Allergies:Review of patient's allergies indicates:  No Known Allergies    Diet: diabetic diet: 1800 calorie    Activities: activity as tolerated    Nursing:   SN to complete comprehensive assessment including routine vital signs. Instruct on disease process and s/s of complications to report to MD. Review/verify medication list sent home with the patient at time of discharge  and instruct patient/caregiver as needed. Frequency may be adjusted depending on start of care date.    Notify MD if SBP > 160 or < 90; DBP > 90 or < 50; HR > 120 or < 50; Temp > 101      CONSULTS:    Physical Therapy to evaluate and treat. Evaluate for home safety and equipment needs; Establish/upgrade home exercise program. Perform / instruct on therapeutic exercises, gait training, transfer training, and Range  "of Motion.  Occupational Therapy to evaluate and treat. Evaluate home environment for safety and equipment needs. Perform/Instruct on transfers, ADL training, ROM, and therapeutic exercises.   to evaluate for community resources/long-range planning.  Aide to provide assistance with personal care, ADLs, and vital signs.    MISC ELLANEOUS CARE:  Diabetic Care:   SN to perform and educate Diabetic management with blood glucose monitoring:, Fingerstick blood sugar AC and HS and Report CBG < 60 or > 350 to physician.       Medications: Review discharge medications with patient and family and provide education.      Current Discharge Medication List      START taking these medications    Details   amoxicillin-clavulanate 250-125mg (AUGMENTIN) 250-125 mg Tab Take 1 tablet by mouth every 12 (twelve) hours.  Qty: 14 tablet, Refills: 0      oseltamivir (TAMIFLU) 75 MG capsule Take 1 capsule (75 mg total) by mouth once daily.  Qty: 5 capsule, Refills: 0         CONTINUE these medications which have NOT CHANGED    Details   amlodipine (NORVASC) 10 MG tablet Take 1 tablet (10 mg total) by mouth once daily.  Qty: 90 tablet, Refills: 1    Associated Diagnoses: Benign hypertensive heart disease without heart failure      aspirin (ECOTRIN) 81 MG EC tablet Take 1 tablet by mouth Daily.       BD ULTRA-FINE LILIAN PEN NEEDLES 32 gauge x 5/32" Ndle use with insulin FOUR TIMES DAILY  Qty: 400 each, Refills: 5      calcium carbonate-vitamin D3 (CALTRATE 600 + D) 600 mg(1,500mg) -400 unit Chew Take 1 tablet by mouth once daily.       diaper,brief,adult,disposable Misc Change 5 times per day  Qty: 100 each, Refills: 11      gabapentin (NEURONTIN) 300 MG capsule TAKE ONE TABLET BY MOUTH TWICE DAILY  Qty: 180 capsule, Refills: 0      insulin glargine (LANTUS SOLOSTAR) 100 unit/mL (3 mL) InPn pen Inject 20 Units into the skin every evening.  Qty: 15 mL, Refills: 5    Associated Diagnoses: Type 2 diabetes mellitus with stage 4 " "chronic kidney disease, with long-term current use of insulin      levothyroxine (SYNTHROID) 50 MCG tablet Take 1 tablet (50 mcg total) by mouth before breakfast.  Qty: 90 tablet, Refills: 1    Comments: Need follow up to discuss labs  Associated Diagnoses: Hypothyroidism, unspecified type      metoprolol tartrate (LOPRESSOR) 100 MG tablet Take 1 tablet (100 mg total) by mouth 2 (two) times daily.  Qty: 180 tablet, Refills: 1    Associated Diagnoses: Benign hypertensive heart disease without congestive heart failure      NOVOLOG FLEXPEN 100 unit/mL InPn pen INJECT 15 UNITS UNDER THE SKIN THREE TIMES DAILY WITH MEALS  Qty: 45 mL, Refills: 0      omega 3-dha-epa-fish oil (FISH OIL) 100-160-1,000 mg Cap Take 1 tablet by mouth Daily.       paroxetine (PAXIL) 20 MG tablet Take 1 tablet (20 mg total) by mouth every morning.  Qty: 90 tablet, Refills: 1    Associated Diagnoses: Major depressive disorder, single episode, severe without psychotic features      potassium chloride SA (K-DUR,KLOR-CON) 10 MEQ tablet TAKE ONE TABLET BY MOUTH ONCE DAILY FOR POTASSIUM  Qty: 90 tablet, Refills: 1    Comments: This prescription was filled today(9/7/2016). Any refills authorized will be placed on file.      pravastatin (PRAVACHOL) 20 MG tablet Take 1 tablet (20 mg total) by mouth once daily.  Qty: 90 tablet, Refills: 1    Comments: This prescription was filled today. Any refills authorized will be placed on file.  Associated Diagnoses: Hyperlipidemia, unspecified hyperlipidemia type      SURE COMFORT INSULIN SYRINGE 1/2 mL 31 x 5/16" Syrg USE ALONG WITH INSULIN FOUR TIMES DAILY  Qty: 400 each, Refills: 12      valsartan (DIOVAN) 160 MG tablet Take 1 tablet (160 mg total) by mouth once daily.  Qty: 90 tablet, Refills: 1    Associated Diagnoses: Benign hypertensive heart disease without heart failure      walker (ULTRA-LIGHT ROLLATOR) Misc 1 Units by Misc.(Non-Drug; Combo Route) route once daily.  Qty: 1 each, Refills: 0         STOP " taking these medications       clotrimazole-betamethasone 1-0.05% (LOTRISONE) cream Comments:   Reason for Stopping:         lancets 30 gauge Misc Comments:   Reason for Stopping:         metoprolol succinate (TOPROL-XL) 50 MG 24 hr tablet Comments:   Reason for Stopping:               I certify that this patient is confined to her home and needs intermittent skilled nursing care, physical therapy and occupational therapy.

## 2017-03-02 NOTE — DISCHARGE SUMMARY
[facility] Hospital Medicine  Discharge Summary      Patient Name: Maria D Rodrigues  MRN: [id number]  Admission Date: 3/1/2017  Hospital Length of Stay: 0 days  Discharge Date and Time:  03/02/2017 5:18 PM  Attending Physician: No att. providers found   Discharging Provider: Nicole Lopez PA-C  Primary Care Provider: Carla Guerrero MD      HPI:   Patient is 85 y.o. female with CKD stage 4,  DM II, HTN, HLD, Hx CVA, dementia and thyroid disease who presents to the ED via EMS accompanied by family with c/o hyperglycemia and an altered mental status the morning PTA. Per EMS CBG of 402 and equally as high on presenting labs. Per family patient was found confused, stripping her clothes, and had an episode of diarrhea on herself. She endorses patient has been seeming more fatigue and tired of late along with noted L eye is redness and closure.  Patient unable to give much information during interview given mental/physical state. In ED patient found with elevated BG as mentioned and multiple electrolyte abnormalities--mildly elevated beta hydroxy at 1.7 but normal anion gap. Of note found to be influenza A positive. Given IV insulin and fluids in ED and placed in observation for further evaluation and management.          * No surgery found *      Indwelling Lines/Drains at time of discharge:   Lines/Drains/Airways          No matching active lines, drains, or airways        Hospital Course:   Patient influenza A positive--likely precipitant to hyperglycemia. Treated with on tamiflu, IVF hydration, supportive care with analgesics and anti pyretics. Isolation precautions. Presented with BG of 473--normal anion gap no signs DKA. Given IV insulin and fluid bolus in ED. Provided basal/bolus coverage and achieved improved BG control. Following improved BG control and IV fluids repeat BMP revealed resolution of hypokalemia and improved renal function back to patient baseline. Patient much more alert this  morning and near baseline mentation. Also with questionable UA on presentation--culture positive for gram negative rods although not >100,000 but patient reporting dysuria thus will treat. Started on augmentin as renal function severely limiting tx options. Of note per daughter patient currently live with her sister and both have difficulty with health etc and she feels unsafe for her to return there. She is agreeable to take mother home to live with her and possibly have sitter come to stay with her during the day. Also feel given patient baseline functional status and dementia would benefit from home health. Orders placed for home health PT, OT, skilled nurse--[facility] arranged this. Patient will discharge to home with family with close follow up.             Consults:   Consults         Status Ordering Provider     IP consult to case management  Once     Provider:  (Not yet assigned)    Completed PILO MANCIA          Significant Diagnostic Studies: Labs:   CMP   Recent Labs  Lab 03/01/17  1125 03/02/17  0536   * 151*   K 3.2* 3.7    118*   CO2 27 22*   * 168*   BUN 24* 23   CREATININE 2.3* 1.9*   CALCIUM 10.3 9.5   PROT 9.5*  --    ALBUMIN 3.5  --    BILITOT 0.6  --    ALKPHOS 126  --    AST 30  --    ALT 24  --    ANIONGAP 14 11   ESTGFRAFRICA 22* 27*   EGFRNONAA 19* 24*    and CBC   Recent Labs  Lab 03/01/17  1125 03/02/17  0800   WBC 9.32 9.16   HGB 15.0 13.1   HCT 44.8 40.8    182     Microbiology:   Urine Culture    Lab Results   Component Value Date    LABURIN  03/01/2017     GRAM NEGATIVE TOMMY  50,000 - 99,999 cfu/ml  Identification and susceptibility pending         Pending Diagnostic Studies:     None        Final Active Diagnoses:    Diagnosis Date Noted POA    Influenza A [J10.1] 03/01/2017 Yes    CKD Stage IV [N18.4] 08/28/2016 Yes     Chronic    Hypertension [I10]  Yes     Chronic    Hyperlipidemia [E78.5]  Yes     Chronic    Hypothyroidism [E03.9]  Yes     Chronic     "  Problems Resolved During this Admission:    Diagnosis Date Noted Date Resolved POA    PRINCIPAL PROBLEM:  Type 2 diabetes mellitus, uncontrolled, with renal complications [E11.29, E11.65]  03/02/2017 Yes     Chronic      No new Assessment & Plan notes have been filed under this hospital service since the last note was generated.  Service: Hospital Medicine      Discharged Condition: stable    Disposition: Home-Health Care [facility]    Follow Up:  Follow-up Information     Follow up with Carla Guerrero MD On 3/7/2017.    Specialty:  Family Medicine    Why:  @2:00pm to see Dr Ocampo for hospital follow up, Outpatient services    Contact information:    [address] [place] [place] [address]  [phone]          Follow up with [facility].    Specialty:  Home Health Services    Why:  Home Health    Contact information:    [address]  [place] [address]  [phone]          Patient Instructions:     Diet general   Order Specific Question Answer Comments   Additional restrictions: Diabetic 1800      Activity as tolerated       Medications:  Reconciled Home Medications:   Discharge Medication List as of 3/2/2017 11:46 AM      START taking these medications    Details   amoxicillin-clavulanate 250-125mg (AUGMENTIN) 250-125 mg Tab Take 1 tablet by mouth every 12 (twelve) hours., Starting 3/2/2017, Until Thu 3/9/17, Normal      oseltamivir (TAMIFLU) 75 MG capsule Take 1 capsule (75 mg total) by mouth once daily., Starting 3/2/2017, Until Tue 3/7/17, Normal         CONTINUE these medications which have NOT CHANGED    Details   amlodipine (NORVASC) 10 MG tablet Take 1 tablet (10 mg total) by mouth once daily., Starting 2/7/2017, Until Discontinued, Normal      aspirin (ECOTRIN) 81 MG EC tablet Take 1 tablet by mouth Daily. , Until Discontinued, Historical Med      BD ULTRA-FINE LILIAN PEN NEEDLES 32 gauge x 5/32" Ndle use with insulin FOUR TIMES DAILY, Normal      calcium carbonate-vitamin D3 (CALTRATE " "600 + D) 600 mg(1,500mg) -400 unit Chew Take 1 tablet by mouth once daily. , Until Discontinued, Historical Med      diaper,brief,adult,disposable Misc Change 5 times per day, Normal      gabapentin (NEURONTIN) 300 MG capsule TAKE ONE TABLET BY MOUTH TWICE DAILY, Normal      insulin glargine (LANTUS SOLOSTAR) 100 unit/mL (3 mL) InPn pen Inject 20 Units into the skin every evening., Starting 2/7/2017, Until Discontinued, Normal      levothyroxine (SYNTHROID) 50 MCG tablet Take 1 tablet (50 mcg total) by mouth before breakfast., Starting 2/7/2017, Until Discontinued, Normal      metoprolol tartrate (LOPRESSOR) 100 MG tablet Take 1 tablet (100 mg total) by mouth 2 (two) times daily., Starting 2/7/2017, Until Discontinued, Normal      NOVOLOG FLEXPEN 100 unit/mL InPn pen INJECT 15 UNITS UNDER THE SKIN THREE TIMES DAILY WITH MEALS, Normal      omega 3-dha-epa-fish oil (FISH OIL) 100-160-1,000 mg Cap Take 1 tablet by mouth Daily. , Until Discontinued, Historical Med      paroxetine (PAXIL) 20 MG tablet Take 1 tablet (20 mg total) by mouth every morning., Starting 2/7/2017, Until Discontinued, Normal      potassium chloride SA (K-DUR,KLOR-CON) 10 MEQ tablet TAKE ONE TABLET BY MOUTH ONCE DAILY FOR POTASSIUM, Normal      pravastatin (PRAVACHOL) 20 MG tablet Take 1 tablet (20 mg total) by mouth once daily., Starting 2/7/2017, Until Discontinued, Normal      SURE COMFORT INSULIN SYRINGE 1/2 mL 31 x 5/16" Syrg USE ALONG WITH INSULIN FOUR TIMES DAILY, Normal      valsartan (DIOVAN) 160 MG tablet Take 1 tablet (160 mg total) by mouth once daily., Starting 2/7/2017, Until Discontinued, Normal      walker (ULTRA-LIGHT ROLLATOR) Misc 1 Units by Misc.(Non-Drug; Combo Route) route once daily., Starting 1/8/2015, Until Discontinued, Print         STOP taking these medications       clotrimazole-betamethasone 1-0.05% (LOTRISONE) cream Comments:   Reason for Stopping:         lancets 30 gauge Misc Comments:   Reason for Stopping:         " metoprolol succinate (TOPROL-XL) 50 MG 24 hr tablet Comments:   Reason for Stopping:             Time spent on the discharge of patient: less than thirty minutes    Nicole Lopez PA-C  Department of Hospital Medicine  [facility]

## 2017-03-02 NOTE — NURSING
Pt had multiple very foul smelling bowel movement that has a mocous texture. Dr. Sullivan notifed and order placed for c-diff culture. Awaiting results and will continue to monitor pt closely.

## 2017-03-02 NOTE — ASSESSMENT & PLAN NOTE
Patient influenza A positive--likely precipitant to hyperglycemia. Started on tamiflu, IVF hydration, supportive care with analgesics and anti pyretics. Isolation precautions.

## 2017-03-02 NOTE — PROGRESS NOTES
Order received for HH.  Pt offered list of HH providers from [facility] website.  Patient choice form completed by daughter, Marleni, original to blue folder, copy to daughter.  Pt requested [facility] choose provider.   Facesheet, Orders, H&P, med list and PT & OT eval sent to [facility] via [facility].  Fax confirmation received.  Awaiting return call to confirm providers.    [facility] unable to accept patient, pt assigned [facility].  TN notified daughter of same.

## 2017-03-02 NOTE — PLAN OF CARE
03/02/17 1645   Final Note   Assessment Type Final Discharge Note   Discharge Disposition Home-Health   Discharge planning education complete? Yes   What phone number can be called within the next 1-3 days to see how you are doing after discharge? ([phone])   Discharge plans and expectations educations in teach back method with documentation complete? Yes   Offered [facility] -- Bedside Delivery? n/a   Discharge/Hospital Encounter Summary to (non-[facility]) PCP n/a   Referral to Outpatient Case Management complete? n/a   Referral to / orders for Home Health Complete? Yes   30 day supply of medicines given at discharge, if documented non-compliance / non-adherence? n/a   Any social issues identified prior to discharge? n/a   Did you assess the readiness or willingness of the family or caregiver to support self management of care? Yes   Right Care Referral Info   Post Acute Recommendation Home-care   Referral Type [facility]   Facility Name [facility]

## 2017-03-02 NOTE — NURSING
Pt is discharged. IV and telemetry monitoring discontinued. Follow up appointments and current medications reviewed with the pt. Verbalized understanding.

## 2017-03-02 NOTE — H&P
[facility] Hospital Medicine  History & Physical    Patient Name: Maria D Rodrigues  MRN: [id number]  Admission Date: 3/1/2017  Attending Physician: Lisa Toledo MD   Primary Care Provider: Carla Guerrero MD         Patient information was obtained from patient and ER records.     Subjective:     Principal Problem:<principal problem not specified>    Chief Complaint:   Chief Complaint   Patient presents with    Hyperglycemia     via ems pt's family checked pt's CBG and it read HIGH. CBG by [facility] 402,  unable to start IV PTA.     Altered Mental Status     per family pt was taking off clothes and disoriented. Pt was incontinent of stool. Not her baseline.         HPI: Patient is 85 y.o. female with CKD stage 4,  DM II, HTN, HLD, Hx CVA, dementia and thyroid disease who presents to the ED via EMS accompanied by family with c/o hyperglycemia and an altered mental status the morning PTA. Per EMS CBG of 402 and equally as high on presenting labs. Per family patient was found confused, stripping her clothes, and had an episode of diarrhea on herself. She endorses patient has been seeming more fatigue and tired of late along with noted L eye is redness and closure.  Patient unable to give much information during interview given mental/physical state. In ED patient found with elevated BG as mentioned and multiple electrolyte abnormalities--mildly elevated beta hydroxy at 1.7 but normal anion gap. Of note found to be influenza A positive. Given IV insulin and fluids in ED and placed in observation for further evaluation and management.          Past Medical History:   Diagnosis Date    Chronic kidney disease     CKD (chronic kidney disease) stage 4, GFR 15-29 ml/min     Degenerative joint disease involving multiple joints     Diabetes mellitus type II     insulin dependent    Diabetes mellitus with neuropathy     DNR (do not resuscitate)     HTN (hypertension)     Hyperlipidemia      "Stroke     Thyroid disease     hypothyroidism    Thyroid nodule        Past Surgical History:   Procedure Laterality Date    APPENDECTOMY      BACK SURGERY      CATARACT EXTRACTION, BILATERAL       SECTION      CHOLECYSTECTOMY      HYSTERECTOMY         Review of patient's allergies indicates:  No Known Allergies    No current facility-administered medications on file prior to encounter.      Current Outpatient Prescriptions on File Prior to Encounter   Medication Sig    amlodipine (NORVASC) 10 MG tablet Take 1 tablet (10 mg total) by mouth once daily.    aspirin (ECOTRIN) 81 MG EC tablet Take 1 tablet by mouth Daily.     BD ULTRA-FINE LILIAN PEN NEEDLES 32 gauge x 5/32" Ndle use with insulin FOUR TIMES DAILY    calcium carbonate-vitamin D3 (CALTRATE 600 + D) 600 mg(1,500mg) -400 unit Chew Take 1 tablet by mouth once daily.     diaper,brief,adult,disposable Misc Change 5 times per day    gabapentin (NEURONTIN) 300 MG capsule TAKE ONE TABLET BY MOUTH TWICE DAILY    insulin glargine (LANTUS SOLOSTAR) 100 unit/mL (3 mL) InPn pen Inject 20 Units into the skin every evening.    levothyroxine (SYNTHROID) 50 MCG tablet Take 1 tablet (50 mcg total) by mouth before breakfast.    metoprolol tartrate (LOPRESSOR) 100 MG tablet Take 1 tablet (100 mg total) by mouth 2 (two) times daily.    NOVOLOG FLEXPEN 100 unit/mL InPn pen INJECT 15 UNITS UNDER THE SKIN THREE TIMES DAILY WITH MEALS    omega 3-dha-epa-fish oil (FISH OIL) 100-160-1,000 mg Cap Take 1 tablet by mouth Daily.     paroxetine (PAXIL) 20 MG tablet Take 1 tablet (20 mg total) by mouth every morning.    potassium chloride SA (K-DUR,KLOR-CON) 10 MEQ tablet TAKE ONE TABLET BY MOUTH ONCE DAILY FOR POTASSIUM    pravastatin (PRAVACHOL) 20 MG tablet Take 1 tablet (20 mg total) by mouth once daily.    SURE COMFORT INSULIN SYRINGE 1/2 mL 31 x 5/16" Syrg USE ALONG WITH INSULIN FOUR TIMES DAILY    valsartan (DIOVAN) 160 MG tablet Take 1 tablet (160 mg " total) by mouth once daily.    walker (ULTRA-LIGHT ROLLATOR) Misc 1 Units by Misc.(Non-Drug; Combo Route) route once daily.    clotrimazole-betamethasone 1-0.05% (LOTRISONE) cream APPLY TO AFFECTED AREA TWICE DAILY    [DISCONTINUED] lancets 30 gauge Misc Inject 1 lancet as directed 4 (four) times daily.    [DISCONTINUED] metoprolol succinate (TOPROL-XL) 50 MG 24 hr tablet Take 1 tablet (50 mg total) by mouth once daily. 1 Tablet Extended Release 24 hr Oral Every day     Family History     Problem Relation (Age of Onset)    Diabetes Mother    Learning disabilities Sister        Social History Main Topics    Smoking status: Former Smoker     Quit date: 8/17/1956    Smokeless tobacco: Not on file    Alcohol use No    Drug use: No    Sexual activity: No     Review of Systems   Constitutional: Positive for activity change, appetite change and fatigue. Negative for chills and fever.   HENT: Negative for congestion and sore throat.    Eyes: Positive for discharge and redness.   Respiratory: Negative for cough and shortness of breath.    Cardiovascular: Negative for chest pain and palpitations.   Gastrointestinal: Positive for diarrhea. Negative for abdominal pain and nausea.   Endocrine: Negative for cold intolerance and heat intolerance.   Genitourinary: Negative for dysuria and frequency.   Musculoskeletal: Negative for arthralgias and myalgias.   Skin: Negative for color change and rash.   Neurological: Negative for dizziness and headaches.   Psychiatric/Behavioral: Positive for confusion. Negative for behavioral problems.     Objective:     Vital Signs (Most Recent):  Temp: 98.8 °F (37.1 °C) (03/02/17 0400)  Pulse: 63 (03/02/17 0400)  Resp: 18 (03/02/17 0400)  BP: (!) 188/87 (03/02/17 0400)  SpO2: 96 % (03/02/17 0400) Vital Signs (24h Range):  Temp:  [98.3 °F (36.8 °C)-99.3 °F (37.4 °C)] 98.8 °F (37.1 °C)  Pulse:  [] 63  Resp:  [18-20] 18  SpO2:  [95 %-98 %] 96 %  BP: (142-206)/() 188/87      Weight: 77.7 kg (171 lb 4.8 oz)  Body mass index is 27.65 kg/(m^2).    Physical Exam   Constitutional: She appears well-developed and well-nourished. No distress.   Ill appearing elderly female   HENT:   Head: Normocephalic and atraumatic.   Eyes: EOM are normal. Pupils are equal, round, and reactive to light. Left eye exhibits discharge.   Neck: Normal range of motion. Neck supple.   Cardiovascular: Regular rhythm.    No murmur heard.  tachycardic   Pulmonary/Chest: Effort normal and breath sounds normal.   Abdominal: Soft. Bowel sounds are normal. There is no tenderness.   Musculoskeletal: Normal range of motion.   Neurological:   Fatigued but able to answer simple yes or no questions   Skin: Skin is warm and dry. No rash noted.   Psychiatric: She has a normal mood and affect. Her behavior is normal.        Significant Labs:   CBC:   Recent Labs  Lab 03/01/17  1122 03/01/17  1125   WBC  --  9.32   HGB  --  15.0   HCT 42 44.8   PLT  --  215     CMP:   Recent Labs  Lab 03/01/17  1125   *   K 3.2*      CO2 27   *   BUN 24*   CREATININE 2.3*   CALCIUM 10.3   PROT 9.5*   ALBUMIN 3.5   BILITOT 0.6   ALKPHOS 126   AST 30   ALT 24   ANIONGAP 14   EGFRNONAA 19*     POCT Glucose:   Recent Labs  Lab 03/01/17  1559 03/01/17  1857 03/01/17  1957   POCTGLUCOSE 391* 387* 396*     Urine Studies:   Recent Labs  Lab 03/01/17  1215   COLORU Yellow   APPEARANCEUA Hazy*   PHUR 6.0   SPECGRAV 1.025   PROTEINUA 3+*   GLUCUA 3+*   KETONESU 1+*   BILIRUBINUA Negative   OCCULTUA 2+*   NITRITE Negative   UROBILINOGEN Negative   LEUKOCYTESUR Negative   RBCUA 2   WBCUA 8*   BACTERIA Many*   HYALINECASTS 0       Significant Imaging:   CT Head: There is no evidence acute intracranial abnormality.  Specifically there is no evidence acute infarct, contusion, extra-axial fluid collection or midline shift.  The ventricles are normal in size and configuration.  The calvarium is intact. There is patchy opacification of ethmoid  air cells.    X ray Chest: 1.  Left basilar opacity which is unchanged in comparison to prior exam and likely represents scarring.  2.  Opacification along the right upper thoracic spine is noted which is likely projectional and represents a prominent right vascular pedicle.       Assessment/Plan:     Influenza A  Patient influenza A positive--likely precipitant to hyperglycemia. Started on tamiflu, IVF hydration, supportive care with analgesics and anti pyretics. Isolation precautions.     Type 2 diabetes mellitus, uncontrolled, with renal complications with hyperglycemia  Patient A1c 12.2 August 2016--poor control. Currently on basal/bolus regimen. Presented with BG of 473--normal anion gap no signs DKA. Given IV insulin and fluid bolus in ED. Will continue to monitor closely with POCT checks and provide basal/bolus as well as SSI coverage PRN.     Essential Hypertenstion  Poor control, will resume home anti hypertensive regimen and monitor    Hyperlipidemia   July 2016--not quite optimally controlled. Will continue statin.    Hypothyroidism  Continue synthroid.     CKD Stage IV  Creatinine seems to be slightly above baseline on presentation at 2.3--likely MARYANN on CKD 2/2 dehydration. Per daughter patient with decreased PO intake over past few days. Will monitor and provide IVFs. Avoid nephrotoxic agents and renally dose medications.       VTE Risk Mitigation         Ordered     enoxaparin injection 30 mg  Daily     Route:  Subcutaneous        03/01/17 1922     Medium Risk of VTE  Once      03/01/17 1922        Nicole Lopez PA-C  Department of Hospital Medicine   [facility]

## 2017-03-02 NOTE — ASSESSMENT & PLAN NOTE
Patient A1c 12.2 August 2016--poor control. Currently on basal/bolus regimen. Presented with BG of 473--normal anion gap no signs DKA. Given IV insulin and fluid bolus in ED. Will continue to monitor closely with POCT checks and provide basal/bolus as well as SSI coverage PRN.

## 2017-03-02 NOTE — PLAN OF CARE
03/02/17 1000   Discharge Assessment   Assessment Type Discharge Planning Assessment   Confirmed/corrected address and phone number on facesheet? Yes   Assessment information obtained from? Caregiver   Expected Length of Stay (days) 1   Communicated expected length of stay with patient/caregiver yes   If Healthcare Directive is received, is it scanned into Epic? no (comment)   Prior to hospitilization cognitive status: Not Oriented to Time  (Alert)   Prior to hospitalization functional status: Partially Dependent   Current cognitive status: Not Oriented to Time   Current Functional Status: Partially Dependent   Arrived From home or self-care   Lives With child(fernanda), adult   Able to Return to Prior Arrangements yes   Is patient able to care for self after discharge? Yes   How many people do you have in your home that can help with your care after discharge? 1   Who are your caregiver(s) and their phone number(s)? DaughterMarleni who is nurse   Patient's perception of discharge disposition home health   Readmission Within The Last 30 Days no previous admission in last 30 days   Patient currently being followed by outpatient case management? No   Patient currently receives home health services? No   Does the patient currently use HME? Yes   Name and contact number for HME provider: Doesn't know   Patient currently receives private duty nursing? No   Patient currently receives any other outside agency services? Yes   Name and contact number of agency or person providing outside services [facility]   Is it the patient/care giver preference to resume care with the current outside agency? Yes   Equipment Currently Used at Home wheelchair;cane, straight;rollator;3-in-1 commode;shower chair   Do you have any problems affording any of your prescribed medications? No   Is the patient taking medications as prescribed? yes   Do you have any financial concerns preventing you from receiving the healthcare you need? No  "  Does the patient have transportation to healthcare appointments? Yes   Transportation Available family or friend will provide   On Dialysis? No   Does the patient receive services at the Coumadin Clinic? No   Discharge Plan A Home Health   Discharge Plan B Home with family   Patient/Family In Agreement With Plan yes     [facility] - [place], [place] - [address]  [address]  [facility] [address]  Phone: [phone] Fax: [phone]    [facility] [place] [place] - [address] AT [facility]  [address] [address]  Phone: [phone] Fax: [phone]    [facility] [place] [address] [address]  Phone: [phone] Fax: [phone]    TN/ANASTACIA roll explained to pt.  TN's name and contact info placed on white board.  Pt/family encouraged to call for any problems/concerns with DC. "Discharge planning begins on Admission" pamphlet discussed and placed in "My Health Packet" and placed at bedside.    "

## 2017-03-02 NOTE — PLAN OF CARE
Problem: Patient Care Overview  Goal: Plan of Care Review  Outcome: Ongoing (interventions implemented as appropriate)    03/01/17 2030   Coping/Psychosocial   Plan Of Care Reviewed With patient;daughter   Pt remained free of falls during current shift. Denied pain and did not receive any prn pain medications. Pt UA is positive for bacteria, however pt is not receiving IV antibiotics, but IV fluid. Monitoring pt's blood glucose level and administered prn novolog subQ per sliding scale. Plan of care and fall precautions reviewed with pt and verbalized understanding. Bed locked, lowered, SR up x2 and call light placed within reach.

## 2017-03-02 NOTE — ASSESSMENT & PLAN NOTE
Creatinine seems to be slightly above baseline on presentation at 2.3--likely MARYANN on CKD 2/2 dehydration. Per daughter patient with decreased PO intake over past few days. Will monitor and provide IVFs. Avoid nephrotoxic agents and renally dose medications.

## 2017-03-02 NOTE — SUBJECTIVE & OBJECTIVE
"Past Medical History:   Diagnosis Date    Chronic kidney disease     CKD (chronic kidney disease) stage 4, GFR 15-29 ml/min     Degenerative joint disease involving multiple joints     Diabetes mellitus type II     insulin dependent    Diabetes mellitus with neuropathy     DNR (do not resuscitate)     HTN (hypertension)     Hyperlipidemia     Stroke     Thyroid disease     hypothyroidism    Thyroid nodule        Past Surgical History:   Procedure Laterality Date    APPENDECTOMY      BACK SURGERY      CATARACT EXTRACTION, BILATERAL       SECTION      CHOLECYSTECTOMY      HYSTERECTOMY         Review of patient's allergies indicates:  No Known Allergies    No current facility-administered medications on file prior to encounter.      Current Outpatient Prescriptions on File Prior to Encounter   Medication Sig    amlodipine (NORVASC) 10 MG tablet Take 1 tablet (10 mg total) by mouth once daily.    aspirin (ECOTRIN) 81 MG EC tablet Take 1 tablet by mouth Daily.     BD ULTRA-FINE LILIAN PEN NEEDLES 32 gauge x 5/32" Ndle use with insulin FOUR TIMES DAILY    calcium carbonate-vitamin D3 (CALTRATE 600 + D) 600 mg(1,500mg) -400 unit Chew Take 1 tablet by mouth once daily.     diaper,brief,adult,disposable Misc Change 5 times per day    gabapentin (NEURONTIN) 300 MG capsule TAKE ONE TABLET BY MOUTH TWICE DAILY    insulin glargine (LANTUS SOLOSTAR) 100 unit/mL (3 mL) InPn pen Inject 20 Units into the skin every evening.    levothyroxine (SYNTHROID) 50 MCG tablet Take 1 tablet (50 mcg total) by mouth before breakfast.    metoprolol tartrate (LOPRESSOR) 100 MG tablet Take 1 tablet (100 mg total) by mouth 2 (two) times daily.    NOVOLOG FLEXPEN 100 unit/mL InPn pen INJECT 15 UNITS UNDER THE SKIN THREE TIMES DAILY WITH MEALS    omega 3-dha-epa-fish oil (FISH OIL) 100-160-1,000 mg Cap Take 1 tablet by mouth Daily.     paroxetine (PAXIL) 20 MG tablet Take 1 tablet (20 mg total) by mouth every " "morning.    potassium chloride SA (K-DUR,KLOR-CON) 10 MEQ tablet TAKE ONE TABLET BY MOUTH ONCE DAILY FOR POTASSIUM    pravastatin (PRAVACHOL) 20 MG tablet Take 1 tablet (20 mg total) by mouth once daily.    SURE COMFORT INSULIN SYRINGE 1/2 mL 31 x 5/16" Syrg USE ALONG WITH INSULIN FOUR TIMES DAILY    valsartan (DIOVAN) 160 MG tablet Take 1 tablet (160 mg total) by mouth once daily.    walker (ULTRA-LIGHT ROLLATOR) Misc 1 Units by Misc.(Non-Drug; Combo Route) route once daily.    clotrimazole-betamethasone 1-0.05% (LOTRISONE) cream APPLY TO AFFECTED AREA TWICE DAILY    [DISCONTINUED] lancets 30 gauge Misc Inject 1 lancet as directed 4 (four) times daily.    [DISCONTINUED] metoprolol succinate (TOPROL-XL) 50 MG 24 hr tablet Take 1 tablet (50 mg total) by mouth once daily. 1 Tablet Extended Release 24 hr Oral Every day     Family History     Problem Relation (Age of Onset)    Diabetes Mother    Learning disabilities Sister        Social History Main Topics    Smoking status: Former Smoker     Quit date: 8/17/1956    Smokeless tobacco: Not on file    Alcohol use No    Drug use: No    Sexual activity: No     Review of Systems   Constitutional: Positive for activity change, appetite change and fatigue. Negative for chills and fever.   HENT: Negative for congestion and sore throat.    Eyes: Positive for discharge and redness.   Respiratory: Negative for cough and shortness of breath.    Cardiovascular: Negative for chest pain and palpitations.   Gastrointestinal: Positive for diarrhea. Negative for abdominal pain and nausea.   Endocrine: Negative for cold intolerance and heat intolerance.   Genitourinary: Negative for dysuria and frequency.   Musculoskeletal: Negative for arthralgias and myalgias.   Skin: Negative for color change and rash.   Neurological: Negative for dizziness and headaches.   Psychiatric/Behavioral: Positive for confusion. Negative for behavioral problems.     Objective:     Vital Signs " (Most Recent):  Temp: 98.8 °F (37.1 °C) (03/02/17 0400)  Pulse: 63 (03/02/17 0400)  Resp: 18 (03/02/17 0400)  BP: (!) 188/87 (03/02/17 0400)  SpO2: 96 % (03/02/17 0400) Vital Signs (24h Range):  Temp:  [98.3 °F (36.8 °C)-99.3 °F (37.4 °C)] 98.8 °F (37.1 °C)  Pulse:  [] 63  Resp:  [18-20] 18  SpO2:  [95 %-98 %] 96 %  BP: (142-206)/() 188/87     Weight: 77.7 kg (171 lb 4.8 oz)  Body mass index is 27.65 kg/(m^2).    Physical Exam   Constitutional: She appears well-developed and well-nourished. No distress.   Ill appearing elderly female   HENT:   Head: Normocephalic and atraumatic.   Eyes: EOM are normal. Pupils are equal, round, and reactive to light. Left eye exhibits discharge.   Neck: Normal range of motion. Neck supple.   Cardiovascular: Regular rhythm.    No murmur heard.  tachycardic   Pulmonary/Chest: Effort normal and breath sounds normal.   Abdominal: Soft. Bowel sounds are normal. There is no tenderness.   Musculoskeletal: Normal range of motion.   Neurological:   Fatigued but able to answer simple yes or no questions   Skin: Skin is warm and dry. No rash noted.   Psychiatric: She has a normal mood and affect. Her behavior is normal.        Significant Labs:   CBC:   Recent Labs  Lab 03/01/17  1122 03/01/17  1125   WBC  --  9.32   HGB  --  15.0   HCT 42 44.8   PLT  --  215     CMP:   Recent Labs  Lab 03/01/17  1125   *   K 3.2*      CO2 27   *   BUN 24*   CREATININE 2.3*   CALCIUM 10.3   PROT 9.5*   ALBUMIN 3.5   BILITOT 0.6   ALKPHOS 126   AST 30   ALT 24   ANIONGAP 14   EGFRNONAA 19*     POCT Glucose:   Recent Labs  Lab 03/01/17  1559 03/01/17  1857 03/01/17  1957   POCTGLUCOSE 391* 387* 396*     Urine Studies:   Recent Labs  Lab 03/01/17  1215   COLORU Yellow   APPEARANCEUA Hazy*   PHUR 6.0   SPECGRAV 1.025   PROTEINUA 3+*   GLUCUA 3+*   KETONESU 1+*   BILIRUBINUA Negative   OCCULTUA 2+*   NITRITE Negative   UROBILINOGEN Negative   LEUKOCYTESUR Negative   RBCUA 2   WBCUA 8*    BACTERIA Many*   HYALINECASTS 0       Significant Imaging:   CT Head: There is no evidence acute intracranial abnormality.  Specifically there is no evidence acute infarct, contusion, extra-axial fluid collection or midline shift.  The ventricles are normal in size and configuration.  The calvarium is intact. There is patchy opacification of ethmoid air cells.    X ray Chest: 1.  Left basilar opacity which is unchanged in comparison to prior exam and likely represents scarring.  2.  Opacification along the right upper thoracic spine is noted which is likely projectional and represents a prominent right vascular pedicle.

## 2017-03-02 NOTE — PLAN OF CARE
03/02/17 1127   Right Care Assessment   Can the patient answer the patient profile reliably? Yes, cognitively intact   How often would a person be available to care for the patient? Often   How does the patient rate their overall health at the present time? Poor   Number of comorbid conditions (as recorded on the chart) Three   During the past month, has the patient often been bothered by feeling down, depressed or hopeless? No   During the past month, has the patient often been bothered by little interest or pleasure in doing things? No

## 2017-03-03 LAB — BACTERIA UR CULT: NORMAL

## 2017-03-06 DIAGNOSIS — I11.9 BENIGN HYPERTENSIVE HEART DISEASE WITHOUT HEART FAILURE: ICD-10-CM

## 2017-03-06 LAB
BACTERIA BLD CULT: NORMAL
BACTERIA BLD CULT: NORMAL

## 2017-03-06 RX ORDER — AMLODIPINE BESYLATE 10 MG/1
TABLET ORAL
Qty: 30 TABLET | Refills: 2 | Status: SHIPPED | OUTPATIENT
Start: 2017-03-06 | End: 2017-03-13 | Stop reason: ALTCHOICE

## 2017-03-07 DIAGNOSIS — I11.9 BENIGN HYPERTENSIVE HEART DISEASE WITHOUT HEART FAILURE: ICD-10-CM

## 2017-03-07 RX ORDER — AMLODIPINE BESYLATE 10 MG/1
TABLET ORAL
Qty: 30 TABLET | Refills: 0 | Status: SHIPPED | OUTPATIENT
Start: 2017-03-07 | End: 2017-03-13 | Stop reason: ALTCHOICE

## 2017-03-08 DIAGNOSIS — I11.9 BENIGN HYPERTENSIVE HEART DISEASE WITHOUT HEART FAILURE: ICD-10-CM

## 2017-03-09 ENCOUNTER — HOSPITAL ENCOUNTER (OUTPATIENT)
Dept: RADIOLOGY | Facility: HOSPITAL | Age: 82
Discharge: HOME | End: 2017-03-09
Attending: FAMILY MEDICINE
Payer: MEDICARE

## 2017-03-09 ENCOUNTER — OFFICE VISIT (OUTPATIENT)
Dept: FAMILY MEDICINE | Facility: CLINIC | Age: 82
End: 2017-03-09
Payer: MEDICARE

## 2017-03-09 VITALS
SYSTOLIC BLOOD PRESSURE: 138 MMHG | TEMPERATURE: 99 F | DIASTOLIC BLOOD PRESSURE: 64 MMHG | HEIGHT: 67 IN | OXYGEN SATURATION: 97 % | HEART RATE: 55 BPM

## 2017-03-09 DIAGNOSIS — R60.0 EDEMA OF UPPER EXTREMITY: ICD-10-CM

## 2017-03-09 DIAGNOSIS — E11.65 UNCONTROLLED TYPE 2 DIABETES MELLITUS WITH MODERATE NONPROLIFERATIVE RETINOPATHY WITHOUT MACULAR EDEMA, WITH LONG-TERM CURRENT USE OF INSULIN, UNSPECIFIED LATERALITY: ICD-10-CM

## 2017-03-09 DIAGNOSIS — I11.9 BENIGN HYPERTENSIVE HEART DISEASE WITHOUT HEART FAILURE: ICD-10-CM

## 2017-03-09 DIAGNOSIS — M79.89 LEG SWELLING: ICD-10-CM

## 2017-03-09 DIAGNOSIS — E11.3399 UNCONTROLLED TYPE 2 DIABETES MELLITUS WITH MODERATE NONPROLIFERATIVE RETINOPATHY WITHOUT MACULAR EDEMA, WITH LONG-TERM CURRENT USE OF INSULIN, UNSPECIFIED LATERALITY: ICD-10-CM

## 2017-03-09 DIAGNOSIS — R53.81 DEBILITY: ICD-10-CM

## 2017-03-09 DIAGNOSIS — Z79.4 UNCONTROLLED TYPE 2 DIABETES MELLITUS WITH MODERATE NONPROLIFERATIVE RETINOPATHY WITHOUT MACULAR EDEMA, WITH LONG-TERM CURRENT USE OF INSULIN, UNSPECIFIED LATERALITY: ICD-10-CM

## 2017-03-09 DIAGNOSIS — N18.4 CHRONIC KIDNEY DISEASE, STAGE IV (SEVERE): ICD-10-CM

## 2017-03-09 DIAGNOSIS — E11.9 TYPE II OR UNSPECIFIED TYPE DIABETES MELLITUS WITHOUT MENTION OF COMPLICATION, NOT STATED AS UNCONTROLLED: Primary | ICD-10-CM

## 2017-03-09 DIAGNOSIS — Z79.4 ENCOUNTER FOR LONG-TERM (CURRENT) USE OF INSULIN: ICD-10-CM

## 2017-03-09 DIAGNOSIS — R53.1 WEAKNESS: Primary | ICD-10-CM

## 2017-03-09 PROCEDURE — 1160F RVW MEDS BY RX/DR IN RCRD: CPT | Mod: S$GLB,,, | Performed by: FAMILY MEDICINE

## 2017-03-09 PROCEDURE — 93971 EXTREMITY STUDY: CPT | Mod: TC

## 2017-03-09 PROCEDURE — 99999 PR PBB SHADOW E&M-EST. PATIENT-LVL III: CPT | Mod: PBBFAC,,, | Performed by: FAMILY MEDICINE

## 2017-03-09 PROCEDURE — 99214 OFFICE O/P EST MOD 30 MIN: CPT | Mod: S$GLB,,, | Performed by: FAMILY MEDICINE

## 2017-03-09 PROCEDURE — 1125F AMNT PAIN NOTED PAIN PRSNT: CPT | Mod: S$GLB,,, | Performed by: FAMILY MEDICINE

## 2017-03-09 PROCEDURE — 93970 EXTREMITY STUDY: CPT | Mod: 26,,, | Performed by: RADIOLOGY

## 2017-03-09 PROCEDURE — 3078F DIAST BP <80 MM HG: CPT | Mod: S$GLB,,, | Performed by: FAMILY MEDICINE

## 2017-03-09 PROCEDURE — 99499 UNLISTED E&M SERVICE: CPT | Mod: S$PBB,,, | Performed by: FAMILY MEDICINE

## 2017-03-09 PROCEDURE — 3075F SYST BP GE 130 - 139MM HG: CPT | Mod: S$GLB,,, | Performed by: FAMILY MEDICINE

## 2017-03-09 PROCEDURE — 93971 EXTREMITY STUDY: CPT | Mod: 26,,, | Performed by: RADIOLOGY

## 2017-03-09 PROCEDURE — 1159F MED LIST DOCD IN RCRD: CPT | Mod: S$GLB,,, | Performed by: FAMILY MEDICINE

## 2017-03-09 PROCEDURE — 1157F ADVNC CARE PLAN IN RCRD: CPT | Mod: S$GLB,,, | Performed by: FAMILY MEDICINE

## 2017-03-09 RX ORDER — DUREZOL 0.5 MG/ML
EMULSION OPHTHALMIC
COMMUNITY
Start: 2017-03-07

## 2017-03-09 RX ORDER — AMLODIPINE BESYLATE 10 MG/1
TABLET ORAL
Qty: 30 TABLET | Refills: 0 | Status: SHIPPED | OUTPATIENT
Start: 2017-03-09 | End: 2017-03-13 | Stop reason: ALTCHOICE

## 2017-03-09 RX ORDER — LATANOPROST 50 UG/ML
SOLUTION/ DROPS OPHTHALMIC
COMMUNITY
Start: 2017-03-07

## 2017-03-09 NOTE — PROGRESS NOTES
"Subjective:       Patient ID: Maria D Rodrigues is a 85 y.o. female.    Chief Complaint: Left Leg Pain; Leg Swelling; and Right Shoulder Pain    HPI Comments: Patient is here for hospital follow-up.  Hospital Course:   Patient influenza A positive--likely precipitant to hyperglycemia. Treated with on tamiflu, IVF hydration, supportive care with analgesics and anti pyretics. Isolation precautions. Presented with BG of 473--normal anion gap no signs DKA. Given IV insulin and fluid bolus in ED. Provided basal/bolus coverage and achieved improved BG control. Following improved BG control and IV fluids repeat BMP revealed resolution of hypokalemia and improved renal function back to patient baseline. Patient much more alert this morning and near baseline mentation. Also with questionable UA on presentation--culture positive for gram negative rods although not >100,000 but patient reporting dysuria thus will treat. Started on augmentin as renal function severely limiting tx options. Of note per daughter patient currently live with her sister and both have difficulty with health etc and she feels unsafe for her to return there. She is agreeable to take mother home to live with her and possibly have sitter come to stay with her during the day. Also feel given patient baseline functional status and dementia would benefit from home health. Orders placed for home health PT, OT, skilled nurse--[place] arranged this. Patient will discharge to home with family with close follow up.         Review of Systems    Objective:       Vitals:    03/09/17 1115   BP: 138/64   Pulse: (!) 55   Temp: 98.6 °F (37 °C)   TempSrc: Oral   SpO2: 97%   Height: 5' 7" (1.702 m)       Physical Exam   Constitutional: She appears well-developed and well-nourished. She has a sickly appearance. No distress.   Seems somewhat out of it. She is alert, but very quiet and appears tired   HENT:   Head: Normocephalic and atraumatic.   Cardiovascular: Normal rate, " regular rhythm and normal heart sounds.  Exam reveals no gallop and no friction rub.    No murmur heard.  Pulmonary/Chest: Effort normal and breath sounds normal. No respiratory distress. She has no wheezes. She has no rales.   Musculoskeletal:        Right shoulder: She exhibits decreased strength. She exhibits normal range of motion, no tenderness, no bony tenderness, no effusion and no pain.        Left knee: She exhibits decreased range of motion and swelling. She exhibits no effusion, no erythema, normal alignment, no LCL laxity, normal patellar mobility, no bony tenderness, normal meniscus and no MCL laxity. No medial joint line and no lateral joint line tenderness noted.        Arms:       Legs:  Skin: She is not diaphoretic.       Assessment:       1. Weakness    2. Debility    3. Chronic kidney disease, stage IV (severe)    4. Encounter for long-term (current) use of insulin    5. 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 Leg swelling    7. Edema of upper extremity        Plan:       Maria D ZIMMERMAN was seen today for left leg pain, leg swelling and right shoulder pain.    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 She now lives with her daughter Marleni and has multiple medical issues. She has PT.OT, and skilled nursing. She needs monitoring to make sure she is taking  medications regularly  And necessary resources are available as she becomes more dependent (SKILLED NURSING VS. NURSING HOME).

## 2017-03-10 NOTE — TELEPHONE ENCOUNTER
Lab called to report that they did not have the urine for the Microalbumunin that was ordered. Informed them that it was Lab collect so therefore we did not collect it or give her a label or a cup. Dr. Mart says that it was okay to cancel it.

## 2017-03-13 ENCOUNTER — TELEPHONE (OUTPATIENT)
Dept: FAMILY MEDICINE | Facility: CLINIC | Age: 82
End: 2017-03-13

## 2017-03-13 DIAGNOSIS — N18.4 CKD (CHRONIC KIDNEY DISEASE), STAGE 4 (SEVERE): ICD-10-CM

## 2017-03-13 DIAGNOSIS — I11.9 BENIGN HYPERTENSIVE HEART DISEASE WITHOUT HEART FAILURE: ICD-10-CM

## 2017-03-13 DIAGNOSIS — Z79.4 TYPE 2 DIABETES MELLITUS WITH STAGE 4 CHRONIC KIDNEY DISEASE, WITH LONG-TERM CURRENT USE OF INSULIN: ICD-10-CM

## 2017-03-13 DIAGNOSIS — N28.9 RENAL INSUFFICIENCY: Primary | ICD-10-CM

## 2017-03-13 DIAGNOSIS — E11.22 TYPE 2 DIABETES MELLITUS WITH STAGE 4 CHRONIC KIDNEY DISEASE, WITH LONG-TERM CURRENT USE OF INSULIN: ICD-10-CM

## 2017-03-13 DIAGNOSIS — N18.4 TYPE 2 DIABETES MELLITUS WITH STAGE 4 CHRONIC KIDNEY DISEASE, WITH LONG-TERM CURRENT USE OF INSULIN: ICD-10-CM

## 2017-03-13 RX ORDER — AMLODIPINE BESYLATE 10 MG/1
TABLET ORAL
Qty: 90 TABLET | Refills: 1 | Status: SHIPPED | OUTPATIENT
Start: 2017-03-13 | End: 2017-03-13 | Stop reason: ALTCHOICE

## 2017-03-13 RX ORDER — HYDRALAZINE HYDROCHLORIDE 25 MG/1
25 TABLET, FILM COATED ORAL EVERY 12 HOURS
Qty: 60 TABLET | Refills: 3 | Status: ON HOLD | OUTPATIENT
Start: 2017-03-13 | End: 2017-04-01 | Stop reason: HOSPADM

## 2017-03-13 NOTE — TELEPHONE ENCOUNTER
Inform Marleni of lab results, consulted nephrology, advise urine culture, recheck labs after starting on regular diabetic regimen in 3 months.

## 2017-03-17 ENCOUNTER — OUTPATIENT CASE MANAGEMENT (OUTPATIENT)
Dept: ADMINISTRATIVE | Facility: OTHER | Age: 82
End: 2017-03-17

## 2017-03-17 NOTE — PROGRESS NOTES
Please note the following patients information has been forwarded to [facility] for case mgmt or  by Outpatient complex care mgmt.    Please see the media section of patients chart for additional details.    Please contact ext [phone] with any questions.    Thank you,    Anabel Desai, SSC

## 2017-03-20 ENCOUNTER — TELEPHONE (OUTPATIENT)
Dept: FAMILY MEDICINE | Facility: CLINIC | Age: 82
End: 2017-03-20

## 2017-03-20 NOTE — TELEPHONE ENCOUNTER
Hoagn/ ANAYELI called inquiring about what's the reason patient needs a Case Management; informed her it looks like for a  to assist with transition with possible home transfer and resources.

## 2017-03-20 NOTE — TELEPHONE ENCOUNTER
----- Message from Anabel Desai sent at 3/17/2017  5:14 PM CDT -----  Please note the following patients information has been forwarded to [facility] for case mgmt or  by Outpatient complex care mgmt.    Please see the media section of patients chart for additional details.    Please contact ext [phone] with any questions.    Thank you,    Anabel Desai, SSC

## 2017-03-20 NOTE — TELEPHONE ENCOUNTER
----- Message from Elodia Maynard sent at 3/20/2017  9:46 AM CDT -----  REFERRAL: Pt scheduled to see Dr. Champagne on 3/30/17 @ 3:30p.

## 2017-03-27 ENCOUNTER — HOSPITAL ENCOUNTER (INPATIENT)
Facility: HOSPITAL | Age: 82
LOS: 5 days | Discharge: HOME HEALTH | DRG: 193 | End: 2017-04-01
Attending: EMERGENCY MEDICINE | Admitting: INTERNAL MEDICINE
Payer: MEDICARE

## 2017-03-27 DIAGNOSIS — I10 ESSENTIAL HYPERTENSION: Chronic | ICD-10-CM

## 2017-03-27 DIAGNOSIS — N18.4 CKD (CHRONIC KIDNEY DISEASE), STAGE IV: Chronic | ICD-10-CM

## 2017-03-27 DIAGNOSIS — Z91.81 RISK FOR FALLS: ICD-10-CM

## 2017-03-27 DIAGNOSIS — E87.6 HYPOKALEMIA: ICD-10-CM

## 2017-03-27 DIAGNOSIS — D63.8 ANEMIA OF CHRONIC DISEASE: ICD-10-CM

## 2017-03-27 DIAGNOSIS — M15.9 OSTEOARTHRITIS OF MULTIPLE JOINTS, UNSPECIFIED OSTEOARTHRITIS TYPE: ICD-10-CM

## 2017-03-27 DIAGNOSIS — E78.5 HYPERLIPIDEMIA, UNSPECIFIED HYPERLIPIDEMIA TYPE: Chronic | ICD-10-CM

## 2017-03-27 DIAGNOSIS — J96.90 RESPIRATORY FAILURE: ICD-10-CM

## 2017-03-27 DIAGNOSIS — Z86.73 H/O: CVA (CEREBROVASCULAR ACCIDENT): ICD-10-CM

## 2017-03-27 DIAGNOSIS — D72.829 LEUKOCYTOSIS, UNSPECIFIED TYPE: ICD-10-CM

## 2017-03-27 DIAGNOSIS — E44.0 MODERATE PROTEIN MALNUTRITION: ICD-10-CM

## 2017-03-27 DIAGNOSIS — J18.9 PNEUMONIA OF BOTH LUNGS DUE TO INFECTIOUS ORGANISM, UNSPECIFIED PART OF LUNG: Primary | ICD-10-CM

## 2017-03-27 DIAGNOSIS — I63.9 ISCHEMIC CEREBROVASCULAR ACCIDENT (CVA) OF FRONTAL LOBE: Chronic | ICD-10-CM

## 2017-03-27 DIAGNOSIS — J96.91 RESPIRATORY FAILURE WITH HYPOXIA, UNSPECIFIED CHRONICITY: ICD-10-CM

## 2017-03-27 DIAGNOSIS — G93.41 ENCEPHALOPATHY, METABOLIC: ICD-10-CM

## 2017-03-27 DIAGNOSIS — Z66 DNR (DO NOT RESUSCITATE): ICD-10-CM

## 2017-03-27 DIAGNOSIS — R53.81 DEBILITY: ICD-10-CM

## 2017-03-27 DIAGNOSIS — N18.9 CKD (CHRONIC KIDNEY DISEASE), UNSPECIFIED STAGE: ICD-10-CM

## 2017-03-27 DIAGNOSIS — E03.9 HYPOTHYROIDISM, UNSPECIFIED TYPE: Chronic | ICD-10-CM

## 2017-03-27 DIAGNOSIS — R00.1 BRADYCARDIA: ICD-10-CM

## 2017-03-27 LAB
ALBUMIN SERPL BCP-MCNC: 2.8 G/DL
ALP SERPL-CCNC: 102 U/L
ALT SERPL W/O P-5'-P-CCNC: 33 U/L
ANION GAP SERPL CALC-SCNC: 10 MMOL/L
AST SERPL-CCNC: 47 U/L
BASOPHILS # BLD AUTO: 0.07 K/UL
BASOPHILS NFR BLD: 0.4 %
BILIRUB SERPL-MCNC: 0.4 MG/DL
BNP SERPL-MCNC: 174 PG/ML
BUN SERPL-MCNC: 34 MG/DL
CALCIUM SERPL-MCNC: 9.7 MG/DL
CHLORIDE SERPL-SCNC: 104 MMOL/L
CO2 SERPL-SCNC: 25 MMOL/L
CREAT SERPL-MCNC: 2.4 MG/DL
DIASTOLIC DYSFUNCTION: YES
DIFFERENTIAL METHOD: ABNORMAL
EOSINOPHIL # BLD AUTO: 0.4 K/UL
EOSINOPHIL NFR BLD: 2.7 %
ERYTHROCYTE [DISTWIDTH] IN BLOOD BY AUTOMATED COUNT: 15.7 %
EST. GFR  (AFRICAN AMERICAN): 21 ML/MIN/1.73 M^2
EST. GFR  (NON AFRICAN AMERICAN): 18 ML/MIN/1.73 M^2
ESTIMATED PA SYSTOLIC PRESSURE: 12.49
GLOBAL PERICARDIAL EFFUSION: ABNORMAL
GLUCOSE SERPL-MCNC: 247 MG/DL
HCT VFR BLD AUTO: 35.4 %
HGB BLD-MCNC: 10.9 G/DL
LACTATE SERPL-SCNC: 0.9 MMOL/L
LYMPHOCYTES # BLD AUTO: 1.6 K/UL
LYMPHOCYTES NFR BLD: 10.1 %
MCH RBC QN AUTO: 29.9 PG
MCHC RBC AUTO-ENTMCNC: 30.8 %
MCV RBC AUTO: 97 FL
MITRAL VALVE REGURGITATION: ABNORMAL
MONOCYTES # BLD AUTO: 3.2 K/UL
MONOCYTES NFR BLD: 19.9 %
NEUTROPHILS # BLD AUTO: 10.7 K/UL
NEUTROPHILS NFR BLD: 66 %
PLATELET # BLD AUTO: 246 K/UL
PMV BLD AUTO: 10.6 FL
POCT GLUCOSE: 216 MG/DL (ref 70–110)
POCT GLUCOSE: 236 MG/DL (ref 70–110)
POCT GLUCOSE: 264 MG/DL (ref 70–110)
POCT GLUCOSE: 293 MG/DL (ref 70–110)
POTASSIUM SERPL-SCNC: 4 MMOL/L
PROT SERPL-MCNC: 8.5 G/DL
RBC # BLD AUTO: 3.65 M/UL
RETIRED EF AND QEF - SEE NOTES: 50 (ref 55–65)
SODIUM SERPL-SCNC: 139 MMOL/L
TRICUSPID VALVE REGURGITATION: ABNORMAL
TROPONIN I SERPL DL<=0.01 NG/ML-MCNC: 0.02 NG/ML
WBC # BLD AUTO: 16.21 K/UL

## 2017-03-27 PROCEDURE — 84484 ASSAY OF TROPONIN QUANT: CPT

## 2017-03-27 PROCEDURE — 99285 EMERGENCY DEPT VISIT HI MDM: CPT | Mod: 25

## 2017-03-27 PROCEDURE — 80053 COMPREHEN METABOLIC PANEL: CPT

## 2017-03-27 PROCEDURE — 96368 THER/DIAG CONCURRENT INF: CPT

## 2017-03-27 PROCEDURE — 83880 ASSAY OF NATRIURETIC PEPTIDE: CPT

## 2017-03-27 PROCEDURE — 93005 ELECTROCARDIOGRAM TRACING: CPT

## 2017-03-27 PROCEDURE — 85025 COMPLETE CBC W/AUTO DIFF WBC: CPT

## 2017-03-27 PROCEDURE — 93306 TTE W/DOPPLER COMPLETE: CPT | Mod: 26,,, | Performed by: INTERNAL MEDICINE

## 2017-03-27 PROCEDURE — 63600175 PHARM REV CODE 636 W HCPCS: Performed by: EMERGENCY MEDICINE

## 2017-03-27 PROCEDURE — 94640 AIRWAY INHALATION TREATMENT: CPT

## 2017-03-27 PROCEDURE — 27000221 HC OXYGEN, UP TO 24 HOURS

## 2017-03-27 PROCEDURE — 25000242 PHARM REV CODE 250 ALT 637 W/ HCPCS: Performed by: EMERGENCY MEDICINE

## 2017-03-27 PROCEDURE — 83605 ASSAY OF LACTIC ACID: CPT

## 2017-03-27 PROCEDURE — 25000242 PHARM REV CODE 250 ALT 637 W/ HCPCS: Performed by: HOSPITALIST

## 2017-03-27 PROCEDURE — 94761 N-INVAS EAR/PLS OXIMETRY MLT: CPT

## 2017-03-27 PROCEDURE — 25000003 PHARM REV CODE 250: Performed by: EMERGENCY MEDICINE

## 2017-03-27 PROCEDURE — 25000003 PHARM REV CODE 250: Performed by: HOSPITALIST

## 2017-03-27 PROCEDURE — 93306 TTE W/DOPPLER COMPLETE: CPT

## 2017-03-27 PROCEDURE — 63600175 PHARM REV CODE 636 W HCPCS: Performed by: HOSPITALIST

## 2017-03-27 PROCEDURE — 96365 THER/PROPH/DIAG IV INF INIT: CPT

## 2017-03-27 PROCEDURE — 87040 BLOOD CULTURE FOR BACTERIA: CPT | Mod: 59

## 2017-03-27 PROCEDURE — 12000002 HC ACUTE/MED SURGE SEMI-PRIVATE ROOM

## 2017-03-27 RX ORDER — SODIUM CHLORIDE 9 MG/ML
INJECTION, SOLUTION INTRAVENOUS CONTINUOUS
Status: DISCONTINUED | OUTPATIENT
Start: 2017-03-27 | End: 2017-03-27

## 2017-03-27 RX ORDER — HYDRALAZINE HYDROCHLORIDE 25 MG/1
25 TABLET, FILM COATED ORAL EVERY 12 HOURS
Status: DISCONTINUED | OUTPATIENT
Start: 2017-03-27 | End: 2017-03-29

## 2017-03-27 RX ORDER — IBUPROFEN 200 MG
24 TABLET ORAL
Status: DISCONTINUED | OUTPATIENT
Start: 2017-03-27 | End: 2017-04-01 | Stop reason: HOSPADM

## 2017-03-27 RX ORDER — VALSARTAN 80 MG/1
160 TABLET ORAL DAILY
Status: DISCONTINUED | OUTPATIENT
Start: 2017-03-27 | End: 2017-04-01 | Stop reason: HOSPADM

## 2017-03-27 RX ORDER — IBUPROFEN 200 MG
16 TABLET ORAL
Status: DISCONTINUED | OUTPATIENT
Start: 2017-03-27 | End: 2017-04-01 | Stop reason: HOSPADM

## 2017-03-27 RX ORDER — ENOXAPARIN SODIUM 100 MG/ML
30 INJECTION SUBCUTANEOUS EVERY 24 HOURS
Status: DISCONTINUED | OUTPATIENT
Start: 2017-03-27 | End: 2017-04-01 | Stop reason: HOSPADM

## 2017-03-27 RX ORDER — INSULIN ASPART 100 [IU]/ML
10 INJECTION, SOLUTION INTRAVENOUS; SUBCUTANEOUS
Status: DISCONTINUED | OUTPATIENT
Start: 2017-03-27 | End: 2017-03-29

## 2017-03-27 RX ORDER — PRAVASTATIN SODIUM 10 MG/1
20 TABLET ORAL DAILY
Status: DISCONTINUED | OUTPATIENT
Start: 2017-03-27 | End: 2017-04-01 | Stop reason: HOSPADM

## 2017-03-27 RX ORDER — INSULIN ASPART 100 [IU]/ML
0-5 INJECTION, SOLUTION INTRAVENOUS; SUBCUTANEOUS
Status: DISCONTINUED | OUTPATIENT
Start: 2017-03-27 | End: 2017-03-27

## 2017-03-27 RX ORDER — ACETAMINOPHEN 325 MG/1
650 TABLET ORAL EVERY 6 HOURS PRN
Status: DISCONTINUED | OUTPATIENT
Start: 2017-03-27 | End: 2017-04-01 | Stop reason: HOSPADM

## 2017-03-27 RX ORDER — PAROXETINE HYDROCHLORIDE 20 MG/1
20 TABLET, FILM COATED ORAL EVERY MORNING
Status: DISCONTINUED | OUTPATIENT
Start: 2017-03-27 | End: 2017-04-01 | Stop reason: HOSPADM

## 2017-03-27 RX ORDER — LEVOTHYROXINE SODIUM 50 UG/1
50 TABLET ORAL
Status: DISCONTINUED | OUTPATIENT
Start: 2017-03-27 | End: 2017-03-30

## 2017-03-27 RX ORDER — ASPIRIN 81 MG/1
81 TABLET ORAL DAILY
Status: DISCONTINUED | OUTPATIENT
Start: 2017-03-27 | End: 2017-04-01 | Stop reason: HOSPADM

## 2017-03-27 RX ORDER — PANTOPRAZOLE SODIUM 40 MG/1
40 TABLET, DELAYED RELEASE ORAL DAILY
Status: DISCONTINUED | OUTPATIENT
Start: 2017-03-27 | End: 2017-04-01 | Stop reason: HOSPADM

## 2017-03-27 RX ORDER — IPRATROPIUM BROMIDE AND ALBUTEROL SULFATE 2.5; .5 MG/3ML; MG/3ML
3 SOLUTION RESPIRATORY (INHALATION)
Status: COMPLETED | OUTPATIENT
Start: 2017-03-27 | End: 2017-03-27

## 2017-03-27 RX ORDER — GLUCAGON 1 MG
1 KIT INJECTION
Status: DISCONTINUED | OUTPATIENT
Start: 2017-03-27 | End: 2017-04-01 | Stop reason: HOSPADM

## 2017-03-27 RX ORDER — INSULIN ASPART 100 [IU]/ML
1-10 INJECTION, SOLUTION INTRAVENOUS; SUBCUTANEOUS
Status: DISCONTINUED | OUTPATIENT
Start: 2017-03-27 | End: 2017-04-01 | Stop reason: HOSPADM

## 2017-03-27 RX ORDER — ALBUTEROL SULFATE 2.5 MG/.5ML
2.5 SOLUTION RESPIRATORY (INHALATION)
Status: DISCONTINUED | OUTPATIENT
Start: 2017-03-27 | End: 2017-04-01 | Stop reason: HOSPADM

## 2017-03-27 RX ORDER — METOPROLOL TARTRATE 50 MG/1
100 TABLET ORAL 2 TIMES DAILY
Status: DISCONTINUED | OUTPATIENT
Start: 2017-03-27 | End: 2017-03-29

## 2017-03-27 RX ORDER — ONDANSETRON 2 MG/ML
4 INJECTION INTRAMUSCULAR; INTRAVENOUS EVERY 12 HOURS PRN
Status: DISCONTINUED | OUTPATIENT
Start: 2017-03-27 | End: 2017-04-01 | Stop reason: HOSPADM

## 2017-03-27 RX ADMIN — LEVOTHYROXINE SODIUM 50 MCG: 50 TABLET ORAL at 09:03

## 2017-03-27 RX ADMIN — ALBUTEROL SULFATE 2.5 MG: 2.5 SOLUTION RESPIRATORY (INHALATION) at 02:03

## 2017-03-27 RX ADMIN — ENOXAPARIN SODIUM 30 MG: 100 INJECTION SUBCUTANEOUS at 05:03

## 2017-03-27 RX ADMIN — INSULIN ASPART 4 UNITS: 100 INJECTION, SOLUTION INTRAVENOUS; SUBCUTANEOUS at 05:03

## 2017-03-27 RX ADMIN — PANTOPRAZOLE SODIUM 40 MG: 40 TABLET, DELAYED RELEASE ORAL at 09:03

## 2017-03-27 RX ADMIN — IPRATROPIUM BROMIDE AND ALBUTEROL SULFATE 3 ML: .5; 3 SOLUTION RESPIRATORY (INHALATION) at 06:03

## 2017-03-27 RX ADMIN — PRAVASTATIN SODIUM 20 MG: 10 TABLET ORAL at 09:03

## 2017-03-27 RX ADMIN — INSULIN ASPART 3 UNITS: 100 INJECTION, SOLUTION INTRAVENOUS; SUBCUTANEOUS at 09:03

## 2017-03-27 RX ADMIN — HYDRALAZINE HYDROCHLORIDE 25 MG: 25 TABLET ORAL at 09:03

## 2017-03-27 RX ADMIN — INSULIN ASPART 4 UNITS: 100 INJECTION, SOLUTION INTRAVENOUS; SUBCUTANEOUS at 02:03

## 2017-03-27 RX ADMIN — ASPIRIN 81 MG: 81 TABLET, COATED ORAL at 09:03

## 2017-03-27 RX ADMIN — VANCOMYCIN HYDROCHLORIDE 1250 MG: 1 INJECTION, POWDER, LYOPHILIZED, FOR SOLUTION INTRAVENOUS at 06:03

## 2017-03-27 RX ADMIN — ALBUTEROL SULFATE 2.5 MG: 2.5 SOLUTION RESPIRATORY (INHALATION) at 08:03

## 2017-03-27 RX ADMIN — METOPROLOL TARTRATE 100 MG: 50 TABLET ORAL at 09:03

## 2017-03-27 RX ADMIN — INSULIN ASPART 10 UNITS: 100 INJECTION, SOLUTION INTRAVENOUS; SUBCUTANEOUS at 05:03

## 2017-03-27 RX ADMIN — PIPERACILLIN SODIUM,TAZOBACTAM SODIUM 4.5 G: 4; .5 INJECTION, POWDER, FOR SOLUTION INTRAVENOUS at 05:03

## 2017-03-27 RX ADMIN — PAROXETINE HYDROCHLORIDE HEMIHYDRATE 20 MG: 20 TABLET, FILM COATED ORAL at 09:03

## 2017-03-27 RX ADMIN — VALSARTAN 160 MG: 80 TABLET, FILM COATED ORAL at 09:03

## 2017-03-27 RX ADMIN — SODIUM CHLORIDE: 0.9 INJECTION, SOLUTION INTRAVENOUS at 09:03

## 2017-03-27 NOTE — ASSESSMENT & PLAN NOTE
Left knee pain  Assessment and Plan:  Will check x-ray of knee but likely DDD only. Pain control.

## 2017-03-27 NOTE — PLAN OF CARE
[facility] - [place], [place] - [address]  [address]  [place] [address]  Phone: [phone] Fax: [phone]    [facility] - [place] - [address] AT Portal to Registered [facility]  [address]  [facility] [address]  Phone: [phone] Fax: [phone]       03/27/17 173   Discharge Assessment   Assessment Type Discharge Planning Assessment   Assessment information obtained from? Patient;Caregiver   Prior to hospitilization cognitive status: Alert/Oriented   Prior to hospitalization functional status: Needs Assistance;Assistive Equipment   Current cognitive status: Alert/Oriented   Current Functional Status: Assistive Equipment;Needs Assistance   Arrived From home or self-care   Lives With child(fernanda), adult   Able to Return to Prior Arrangements yes   Is patient able to care for self after discharge? No  (Daughter assist)   How many people do you have in your home that can help with your care after discharge? 1   Who are your caregiver(s) and their phone number(s)? Marleni [phone]   Patient's perception of discharge disposition home or selfcare;home health   Readmission Within The Last 30 Days previous discharge plan unsuccessful  (Pt was in observation)   Patient currently being followed by outpatient case management? Yes   If yes, name of outpatient case management following: [facility] outpatient case management   Patient currently receives home health services? Yes   Patient previously received home health services and would like to resume services if necessary? Yes   If yes, name of home health provider: [facility]   Equipment Currently Used at Home wheelchair;cane, straight;rollator;3-in-1 commode;shower chair  (Bed Rails)   Do you have any problems affording any of your prescribed medications? No   Is the patient taking medications as prescribed? yes   Do you have any financial concerns preventing you from receiving the  healthcare you need? No   Does the patient have transportation to healthcare appointments? Yes   Transportation Available family or friend will provide   On Dialysis? No   Discharge Plan A Home with family;Home Health  (Resumed)   Discharge Plan B (Daughter would like to make appointments so she can be present at appointment. )   Patient/Family In Agreement With Plan yes

## 2017-03-27 NOTE — ED PROVIDER NOTES
Encounter Date: 3/27/2017    SCRIBE #1 NOTE: I, Kalina Grimaldo, am scribing for, and in the presence of,  Cordell Oleary MD. I have scribed the following portions of the note - Other sections scribed: HPI/ROS.       History   No chief complaint on file.    Review of patient's allergies indicates:  No Known Allergies  HPI Comments: CC: Shortness of Breath    HPI: 85 y.o. F with HTN, HLD, DM w/ neuropathy, CKD, thyroid disease, and H/O stroke presents to the ED via c/o SOB beginning a few hours ago PTA with associated cough. SpO2 was 84% RA in triage. Pt states she was feeling fine 1 day prior to onset of symptoms. No prior tx. No hx of asthma, emphysema, or COPD. Pt denies fever, CP, abdominal pain, N/V, and any other symptoms.     The history is provided by the patient.     Past Medical History:   Diagnosis Date    Chronic kidney disease     CKD (chronic kidney disease) stage 4, GFR 15-29 ml/min     Degenerative joint disease involving multiple joints     Diabetes mellitus type II     insulin dependent    Diabetes mellitus with neuropathy     DNR (do not resuscitate)     HTN (hypertension)     Hyperlipidemia     Stroke     Thyroid disease     hypothyroidism    Thyroid nodule      Past Surgical History:   Procedure Laterality Date    APPENDECTOMY      BACK SURGERY      CATARACT EXTRACTION, BILATERAL       SECTION      CHOLECYSTECTOMY      HYSTERECTOMY       Family History   Problem Relation Age of Onset    Learning disabilities Sister     Diabetes Mother      Social History   Substance Use Topics    Smoking status: Former Smoker     Quit date: 1956    Smokeless tobacco: Not on file    Alcohol use No     Review of Systems   Respiratory: Positive for cough and shortness of breath.    Cardiovascular: Negative for chest pain.   Gastrointestinal: Negative for vomiting.   All other systems reviewed and are negative.      Physical Exam   Initial Vitals   BP Pulse Resp Temp SpO2   17  0305 03/27/17 0311 -- -- 03/27/17 0305   134/88 66   100 %     Physical Exam    Nursing note and vitals reviewed.  Constitutional: She appears well-developed and well-nourished. She is not diaphoretic. No distress.   HENT:   Head: Normocephalic and atraumatic.   Nose: Nose normal.   Mouth/Throat: Oropharynx is clear and moist. No oropharyngeal exudate.   Eyes: Conjunctivae and EOM are normal. Pupils are equal, round, and reactive to light. No scleral icterus.   Neck: Normal range of motion. Neck supple. No thyromegaly present. No tracheal deviation present.   Cardiovascular: Normal rate, regular rhythm and normal heart sounds. Exam reveals no gallop and no friction rub.    No murmur heard.  Pulmonary/Chest: No respiratory distress. She has no wheezes. She has rhonchi (diffuse, expiratory). She has no rales.   Abdominal: Soft. Bowel sounds are normal. She exhibits no distension and no mass. There is no tenderness. There is no rebound and no guarding.   Musculoskeletal: Normal range of motion. She exhibits no edema or tenderness.   Lymphadenopathy:     She has no cervical adenopathy.   Neurological: She is alert and oriented to person, place, and time. She has normal strength. No cranial nerve deficit or sensory deficit.   Skin: Skin is warm and dry. No rash noted. No erythema. No pallor.   Psychiatric: She has a normal mood and affect. Her behavior is normal. Thought content normal.         ED Course   Critical Care  Date/Time: 3/27/2017 6:41 AM  Performed by: JANETTE WASHBURN III  Authorized by: JANETTE WASHBURN III   Direct patient critical care time: 20 minutes  Additional history critical care time: 6 minutes  Ordering / reviewing critical care time: 8 minutes  Documentation critical care time: 8 minutes  Consulting other physicians critical care time: 5 minutes  Total critical care time (exclusive of procedural time) : 47 minutes  Critical care time was exclusive of teaching time and separately billable procedures  and treating other patients.  Critical care was necessary to treat or prevent imminent or life-threatening deterioration of the following conditions: respiratory failure and sepsis.        Labs Reviewed   CBC W/ AUTO DIFFERENTIAL - Abnormal; Notable for the following:        Result Value    WBC 16.21 (*)     RBC 3.65 (*)     Hemoglobin 10.9 (*)     Hematocrit 35.4 (*)     MCHC 30.8 (*)     RDW 15.7 (*)     Gran # 10.7 (*)     Mono # 3.2 (*)     Lymph% 10.1 (*)     Mono% 19.9 (*)     All other components within normal limits   COMPREHENSIVE METABOLIC PANEL - Abnormal; Notable for the following:     Glucose 247 (*)     BUN, Bld 34 (*)     Creatinine 2.4 (*)     Total Protein 8.5 (*)     Albumin 2.8 (*)     AST 47 (*)     eGFR if  21 (*)     eGFR if non  18 (*)     All other components within normal limits   B-TYPE NATRIURETIC PEPTIDE - Abnormal; Notable for the following:      (*)     All other components within normal limits   CULTURE, BLOOD   CULTURE, BLOOD   TROPONIN I   LACTIC ACID, PLASMA             Medical Decision Making:   Initial Assessment:   86 yo F p/w progressive cough and dyspnea on exertion.  Physical examination does reveal tachypnea in the 20s, oxygen saturation 84% on room air, diffuse expiratory rhonchi, 1+ pitting to bilateral lower extremities.   Differential Diagnosis:   Pneumonia, CHF exacerbation, noncardiogenic pulmonary edema, pleural effusions, pneumothorax, bronchitis  Independently Interpreted Test(s):   I have ordered and independently interpreted X-rays - see summary below.       <> Summary of X-Ray Reading(s): Chest x-ray: Bilateral interstitial and alveolar opacities--edema versus infection  I have ordered and independently interpreted EKG Reading(s) - see summary below       <> Summary of EKG Reading(s): Normal sinus rhythm at 62 bpm, left axis deviation, possible first-degree AV block, otherwise normal intervals, no acute ischemic change  ED  Management:  Patient's workup reveals chronic kidney disease, leukocytosis, mildly elevated BNP of 174, and findings on chest x-ray suggestive of either pulmonary edema or pneumonia.  Patient's current medical picture is more suggestive of pneumonia.  Have ordered broad-spectrum antibiotic.  Lactate level less than 1.  I am somewhat hesitant to be aggressive with IV fluids given patient's advanced age, mildly elevated BNP, unknown baseline cardiac function.  Have placed admission orders, including 2-D echo.            Scribe Attestation:   Scribe #1: I performed the above scribed service and the documentation accurately describes the services I performed. I attest to the accuracy of the note.    Attending Attestation:           Physician Attestation for Scribe:  Physician Attestation Statement for Scribe #1: I, Cordell Oleary MD, reviewed documentation, as scribed by Kalina Grimaldo in my presence, and it is both accurate and complete.                 ED Course     Clinical Impression:   The primary encounter diagnosis was Pneumonia of both lungs due to infectious organism, unspecified part of lung. Diagnoses of Respiratory failure with hypoxia, unspecified chronicity and CKD (chronic kidney disease), unspecified stage were also pertinent to this visit.          Cordell Oleary III, MD  03/27/17 0642

## 2017-03-27 NOTE — ASSESSMENT & PLAN NOTE
Closely monitor blood glucose and make adjustments to insulin regimen as necessary. Check HgbA1C if not already done.

## 2017-03-27 NOTE — ASSESSMENT & PLAN NOTE
DNR confirmed with patient and family at the bedside. Will consult palliative care to address future goals of care issues and get [place]POST if not done.

## 2017-03-27 NOTE — PROGRESS NOTES
Patient breath sounds continue to coarse and she states she still feels SOB. Daughter at bedside very concerned that SOB has not improved after resp treatment. Patient was 95% on 4L NC and appears to be sleeping most of the time. Dr. Salas notified of family concerns no new orders at this time.

## 2017-03-27 NOTE — PROGRESS NOTES
Patient complains of feeling SOB. O2 4L NC in place and O2 sats are 95%. Suction set up at bedside, patient sitting up in bed. Abdominal muscle use when breathing. Dr. Salas notified.

## 2017-03-27 NOTE — ED TRIAGE NOTES
"Patient presents from triage with SOB. The patient states, "I cannot breathe." The patient is sitting with her shoulders slouched and unable to sit up straight. The patient's daughter states that she woke up today unable to breathe. The patient was diagnosed with the flu and hospitalized about a month ago.   "

## 2017-03-27 NOTE — IP AVS SNAPSHOT
[place] [address] [address] [place] [address]  Phone: [phone]           Patient Discharge Instructions   Our goal is to set you up for success. This packet includes information on your condition, medications, and your home care.  It will help you care for yourself to prevent having to return to the hospital.     Please ask your nurse if you have any questions.      There are many details to remember when preparing to leave the hospital. Here is what you will need to do:    1. Take your medicine. If you are prescribed medications, review your Medication List on the following pages. You may have new medications to  at the pharmacy and others that you'll need to stop taking. Review the instructions for how and when to take your medications. Talk with your doctor or nurses if you are unsure of what to do.     2. Go to your follow-up appointments. Specific follow-up information is listed in the following pages. Your may be contacted by a nurse or clinical provider about future appointments. Be sure we have all of the phone numbers to reach you. Please contact your provider's office if you are unable to make an appointment.     3. Watch for warning signs. Your doctor or nurse will give you detailed warning signs to watch for and when to call for assistance. These instructions may also include educational information about your condition. If you experience any of warning signs to your health, call your doctor.               ** Verify the list of medication(s) below is accurate and up to date. Carry this with you in case of emergency. If your medications have changed, please notify your healthcare provider.             Medication List      START taking these medications        Additional Info                      amoxicillin-clavulanate 875-125mg 875-125 mg per tablet   Commonly known as:  AUGMENTIN   Quantity:  14 tablet   Refills:  0   Dose:  1 tablet   Indications:  Pneumonia     "Instructions:  Take 1 tablet by mouth every 12 (twelve) hours.     Begin Date    AM    Noon    PM    Bedtime         CHANGE how you take these medications        Additional Info                      hydrALAZINE 25 MG tablet   Commonly known as:  APRESOLINE   Quantity:  60 tablet   Refills:  1   Dose:  50 mg   What changed:  how much to take    Last time this was given:  75 mg on 4/1/2017  8:30 AM   Instructions:  Take 2 tablets (50 mg total) by mouth every 12 (twelve) hours.     Begin Date    AM    Noon    PM    Bedtime         CONTINUE taking these medications        Additional Info                      aspirin 81 MG EC tablet   Commonly known as:  ECOTRIN   Refills:  0   Dose:  1 tablet    Last time this was given:  81 mg on 4/1/2017  8:28 AM   Instructions:  Take 1 tablet by mouth Daily.     Begin Date    AM    Noon    PM    Bedtime       BD ULTRA-FINE LILIAN PEN NEEDLES 32 gauge x 5/32" Ndle   Quantity:  400 each   Refills:  5   Generic drug:  pen needle, diabetic    Instructions:  use with insulin FOUR TIMES DAILY     Begin Date    AM    Noon    PM    Bedtime       CALTRATE 600 + D 600 mg(1,500mg) -400 unit Chew   Refills:  0   Dose:  1 tablet   Generic drug:  calcium carbonate-vitamin D3    Instructions:  Take 1 tablet by mouth once daily.     Begin Date    AM    Noon    PM    Bedtime       diaper,brief,adult,disposable Misc   Quantity:  100 each   Refills:  11    Instructions:  Change 5 times per day     Begin Date    AM    Noon    PM    Bedtime       DUREZOL 0.05 % Drop ophthalmic solution   Refills:  0   Generic drug:  difluprednate      Begin Date    AM    Noon    PM    Bedtime       FISH -160-1,000 mg Cap   Refills:  0   Dose:  1 tablet   Generic drug:  omega 3-dha-epa-fish oil    Instructions:  Take 1 tablet by mouth Daily.     Begin Date    AM    Noon    PM    Bedtime       gabapentin 300 MG capsule   Commonly known as:  NEURONTIN   Quantity:  180 capsule   Refills:  0    Instructions:  TAKE ONE " TABLET BY MOUTH TWICE DAILY     Begin Date    AM    Noon    PM    Bedtime       insulin glargine 100 unit/mL (3 mL) Inpn pen   Commonly known as:  LANTUS SOLOSTAR   Quantity:  15 mL   Refills:  5   Dose:  20 Units    Instructions:  Inject 20 Units into the skin every evening.     Begin Date    AM    Noon    PM    Bedtime       latanoprost 0.005 % ophthalmic solution   Refills:  0      Begin Date    AM    Noon    PM    Bedtime       levothyroxine 50 MCG tablet   Commonly known as:  SYNTHROID   Quantity:  90 tablet   Refills:  1   Dose:  50 mcg   Comments:  Need follow up to discuss labs    Last time this was given:  75 mcg on 4/1/2017  5:28 AM   Instructions:  Take 1 tablet (50 mcg total) by mouth before breakfast.     Begin Date    AM    Noon    PM    Bedtime       metoprolol tartrate 100 MG tablet   Commonly known as:  LOPRESSOR   Quantity:  180 tablet   Refills:  1   Dose:  100 mg    Last time this was given:  100 mg on 4/1/2017  8:29 AM   Instructions:  Take 1 tablet (100 mg total) by mouth 2 (two) times daily.     Begin Date    AM    Noon    PM    Bedtime       NOVOLOG FLEXPEN 100 unit/mL Inpn pen   Quantity:  45 mL   Refills:  0   Generic drug:  insulin aspart    Last time this was given:  17 Units on 4/1/2017  8:32 AM   Instructions:  INJECT 15 UNITS UNDER THE SKIN THREE TIMES DAILY WITH MEALS     Begin Date    AM    Noon    PM    Bedtime       paroxetine 20 MG tablet   Commonly known as:  PAXIL   Quantity:  90 tablet   Refills:  1   Dose:  20 mg    Last time this was given:  20 mg on 4/1/2017  8:31 AM   Instructions:  Take 1 tablet (20 mg total) by mouth every morning.     Begin Date    AM    Noon    PM    Bedtime       potassium chloride SA 10 MEQ tablet   Commonly known as:  K-DUR,KLOR-CON   Quantity:  90 tablet   Refills:  1   Comments:  This prescription was filled today(9/7/2016). Any refills authorized will be placed on file.    Last time this was given:  20 mEq on 3/31/2017  8:22 PM   Instructions:   TAKE ONE TABLET BY MOUTH ONCE DAILY FOR POTASSIUM     Begin Date    AM    Noon    PM    Bedtime       pravastatin 20 MG tablet   Commonly known as:  PRAVACHOL   Quantity:  90 tablet   Refills:  1   Dose:  20 mg   Comments:  This prescription was filled today. Any refills authorized will be placed on file.    Last time this was given:  20 mg on 4/1/2017  8:29 AM   Instructions:  Take 1 tablet (20 mg total) by mouth once daily.     Begin Date    AM    Noon    PM    Bedtime       SURE COMFORT INSULIN SYRINGE 0.5 mL 31 gauge x 5/16 Syrg   Quantity:  400 each   Refills:  12   Generic drug:  insulin syringe-needle U-100    Instructions:  USE ALONG WITH INSULIN FOUR TIMES DAILY     Begin Date    AM    Noon    PM    Bedtime       valsartan 160 MG tablet   Commonly known as:  DIOVAN   Quantity:  90 tablet   Refills:  1   Dose:  160 mg    Last time this was given:  160 mg on 4/1/2017  8:29 AM   Instructions:  Take 1 tablet (160 mg total) by mouth once daily.     Begin Date    AM    Noon    PM    Bedtime       walker Misc   Commonly known as:  ULTRA-LIGHT ROLLATOR   Quantity:  1 each   Refills:  0   Dose:  1 Units    Instructions:  1 Units by Misc.(Non-Drug; Combo Route) route once daily.     Begin Date    AM    Noon    PM    Bedtime         STOP taking these medications     lancets 30 gauge Misc       metoprolol succinate 50 MG 24 hr tablet   Commonly known as:  TOPROL-XL            Where to Get Your Medications      These medications were sent to [facility] [place] [id number] [address]     Phone:  [phone]     amoxicillin-clavulanate 875-125mg 875-125 mg per tablet    hydrALAZINE 25 MG tablet                  Please bring to all follow up appointments:    1. A copy of your discharge instructions.  2. All medicines you are currently taking in their original bottles.  3. Identification and insurance card.    Please arrive 15 minutes ahead of scheduled appointment  time.    Please call 24 hours in advance if you must reschedule your appointment and/or time.        Your Scheduled Appointments     Apr 05, 2017  3:00 PM T   H[facility] Follow Up with  [facility]   [facility]    [address] [facility] [address]  [place] [address] [phone]              Follow-up Information     Follow up with [facility] On 4/5/2017.    Specialty:  [facility]    Why:  out patient services:  3:00pm follow up from the hospital    Contact information:    [address] [facility] [address]  [place] [address] [phone]        Follow up with [facility] On 4/2/2017.    Specialty:  Home Health Services    Why:  Home Health    Contact information:    [address]  [facility] [address] [phone]          Discharge Instructions     Future Orders    Activity as tolerated     Diet general     Comments:    2000 ADA.    Questions:    Total calories:      Fat restriction, if any:      Protein restriction, if any:      Na restriction, if any:      Fluid restriction:      Additional restrictions:          Primary Diagnosis     Your primary diagnosis was:  Pneumonia Of Both Lungs Due To Infectious Organism      Admission Information     Date & Time Provider Department CSN    3/27/2017  2:56 AM Sisi Salas MD [facility] [id number]      Care Providers     Provider Role Specialty Primary office phone    Sisi Salas MD Attending Provider Hospitalist [phone]    Virginia Lucero RN Consulting Physician  -- Number not on file    Dean Parra III, MD Consulting Physician  Orthopedic Surgery [phone]      Important Medicare Message          Most Recent Value    Important Message from Medicare Regarding Discharge Appeal Rights  Given to patient/caregiver, Explained to patient/caregiver, Signed/date by patient/caregiver yes 03/30/2017 1233      Your Vitals Were     BP Pulse Temp Resp Height Weight    137/63  "54 98.3 °F (36.8 °C) (Oral) 24 5' 6" (1.676 m) 87.1 kg (192 lb)    SpO2 BMI             100% 30.99 kg/m2         Recent Lab Values        9/3/2014 2/11/2015 3/28/2015 9/4/2015 3/30/2016 7/11/2016 8/27/2016 3/9/2017      9:27 AM  8:30 AM  4:50 PM 11:22 AM  8:30 AM 10:50 AM  5:50 PM 10:35 AM    A1C 7.2 (H) 7.0 (H) 6.6 (H) 12.2 (H) 11.6 (H) 11.4 (H) 12.2 (H) 11.3 (H)    Comment for A1C at 10:50 AM on 7/11/2016:  According to ADA guidelines, hemoglobin A1C <7.0% represents  optimal control in non-pregnant diabetic patients.  Different  metrics may apply to specific populations.   Standards of Medical Care in Diabetes - 2016.  For the purpose of screening for the presence of diabetes:  <5.7%     Consistent with the absence of diabetes  5.7-6.4%  Consistent with increasing risk for diabetes   (prediabetes)  >or=6.5%  Consistent with diabetes  Currently no consensus exists for use of hemoglobin A1C  for diagnosis of diabetes for children.      Comment for A1C at  5:50 PM on 8/27/2016:  According to ADA guidelines, hemoglobin A1C <7.0% represents  optimal control in non-pregnant diabetic patients.  Different  metrics may apply to specific populations.   Standards of Medical Care in Diabetes - 2016.  For the purpose of screening for the presence of diabetes:  <5.7%     Consistent with the absence of diabetes  5.7-6.4%  Consistent with increasing risk for diabetes   (prediabetes)  >or=6.5%  Consistent with diabetes  Currently no consensus exists for use of hemoglobin A1C  for diagnosis of diabetes for children.      Comment for A1C at 10:35 AM on 3/9/2017:  According to ADA guidelines, hemoglobin A1C <7.0% represents  optimal control in non-pregnant diabetic patients.  Different  metrics may apply to specific populations.   Standards of Medical Care in Diabetes - 2016.  For the purpose of screening for the presence of diabetes:  <5.7%     Consistent with the absence of diabetes  5.7-6.4%  Consistent with increasing risk for " diabetes   (prediabetes)  >or=6.5%  Consistent with diabetes  Currently no consensus exists for use of hemoglobin A1C  for diagnosis of diabetes for children.        Allergies as of 4/1/2017     No Known Allergies      [facility]     [facility] Nurse Care Line - 24/7 Assistance  Unless otherwise directed by your provider, please contact [facility], our nurse care line that is available for 24/7 assistance.     Registered nurses in the [facility] provide clinical advisement, health education, appointment booking, and other advisory services.  Call for this free service at [phone].        Advance Directives     An advance directive is a document which, in the event you are no longer able to make decisions for yourself, tells your healthcare team what kind of treatment you do or do not want to receive, or who you would like to make those decisions for you.  If you do not currently have an advance directive, [facility] encourages you to create one.  For more information call:  [phone] ([phone]), [phone] ([phone]),  or log on to [email or web address].        Smoking Cessation     If you would like to quit smoking:   You may be eligible for free services if you are a [place] resident and started smoking cigarettes before September 1, 1988.  Call the Smoking Cessation Trust (SCT) toll free at (358) 706-1386 or (256) 217-3780.   Call 7-313-QUIT-NOW if you do not meet the above criteria.   Contact us via email: [email or web address]   View our website for more information: [email or web address]        Language Assistance Services     ATTENTION: Language assistance services are available, free of charge. Please call [phone].      ATENCIÓN: Si habla español, tiene a pichardo disposición servicios gratuitos de asistencia lingüística. Llame al [phone].     CHÚ Ý: N?u b?n nói Ti?ng Vi?t, có các d?ch v? h? tr? ngôn ng? mi?n phí dành cho b?n. G?i s?  [phone].        Stroke Education              Pneumonmia Discharge Instructions                Chronic Kindey Disease Education             [facility] Sign-Up     Activating your [facility] account is as easy as 1-2-3!     1) Visit [email or web address], select Sign Up Now, enter this activation code and your date of birth, then select Next.  [email or web address]  Expires: 4/16/2017 12:45 PM      2) Create a username and password to use when you visit [facility] in the future and select a security question in case you lose your password and select Next.    3) Enter your e-mail address and click Sign Up!    Additional Information  If you have questions, please e-mail [email or web address] or call [phone] to talk to our [facility] staff. Remember, [facility] is NOT to be used for urgent needs. For medical emergencies, dial 911.          [facility] complies with applicable Federal civil rights laws and does not discriminate on the basis of race, color, national origin, age, disability, or sex.

## 2017-03-27 NOTE — ASSESSMENT & PLAN NOTE
Pt with recent admission of influenza with appropriate treatment earlier this month, now with CXR concerning for pneumonia, continue empiric broad spectrum antibiotics and f/u with cultures, and add NEBS and titrate O2 as tolerated.

## 2017-03-27 NOTE — PLAN OF CARE
Problem: Patient Care Overview  Goal: Plan of Care Review  Outcome: Ongoing (interventions implemented as appropriate)  Patient continues to have some labored breathing but O2 sats 95% on 4L NC. Patient incontinent of urine. Changing frequently and turning q2hr. Patient made a DNR today and palliative care consult ordered. Daughter at bedside.

## 2017-03-27 NOTE — SUBJECTIVE & OBJECTIVE
"Past Medical History:   Diagnosis Date    Chronic kidney disease     CKD (chronic kidney disease) stage 4, GFR 15-29 ml/min     Degenerative joint disease involving multiple joints     Dementia     Diabetes mellitus type II     insulin dependent    Diabetes mellitus with neuropathy     DNR (do not resuscitate)     HTN (hypertension)     Hyperlipidemia     Stroke     Thyroid disease     hypothyroidism    Thyroid nodule        Past Surgical History:   Procedure Laterality Date    APPENDECTOMY      BACK SURGERY      CATARACT EXTRACTION, BILATERAL       SECTION      CHOLECYSTECTOMY      HYSTERECTOMY         Review of patient's allergies indicates:  No Known Allergies    No current facility-administered medications on file prior to encounter.      Current Outpatient Prescriptions on File Prior to Encounter   Medication Sig    aspirin (ECOTRIN) 81 MG EC tablet Take 1 tablet by mouth Daily.     BD ULTRA-FINE LILIAN PEN NEEDLES 32 gauge x 5/32" Ndle use with insulin FOUR TIMES DAILY    calcium carbonate-vitamin D3 (CALTRATE 600 + D) 600 mg(1,500mg) -400 unit Chew Take 1 tablet by mouth once daily.     diaper,brief,adult,disposable Misc Change 5 times per day    DUREZOL 0.05 % Drop ophthalmic solution     gabapentin (NEURONTIN) 300 MG capsule TAKE ONE TABLET BY MOUTH TWICE DAILY    hydrALAZINE (APRESOLINE) 25 MG tablet Take 1 tablet (25 mg total) by mouth every 12 (twelve) hours.    insulin glargine (LANTUS SOLOSTAR) 100 unit/mL (3 mL) InPn pen Inject 20 Units into the skin every evening.    latanoprost 0.005 % ophthalmic solution     levothyroxine (SYNTHROID) 50 MCG tablet Take 1 tablet (50 mcg total) by mouth before breakfast.    metoprolol tartrate (LOPRESSOR) 100 MG tablet Take 1 tablet (100 mg total) by mouth 2 (two) times daily.    NOVOLOG FLEXPEN 100 unit/mL InPn pen INJECT 15 UNITS UNDER THE SKIN THREE TIMES DAILY WITH MEALS    omega 3-dha-epa-fish oil (FISH OIL) 100-160-1,000 mg " "Cap Take 1 tablet by mouth Daily.     paroxetine (PAXIL) 20 MG tablet Take 1 tablet (20 mg total) by mouth every morning.    potassium chloride SA (K-DUR,KLOR-CON) 10 MEQ tablet TAKE ONE TABLET BY MOUTH ONCE DAILY FOR POTASSIUM    pravastatin (PRAVACHOL) 20 MG tablet Take 1 tablet (20 mg total) by mouth once daily.    SURE COMFORT INSULIN SYRINGE 1/2 mL 31 x 5/16" Syrg USE ALONG WITH INSULIN FOUR TIMES DAILY    valsartan (DIOVAN) 160 MG tablet Take 1 tablet (160 mg total) by mouth once daily.    walker (ULTRA-LIGHT ROLLATOR) Misc 1 Units by Misc.(Non-Drug; Combo Route) route once daily.    [DISCONTINUED] lancets 30 gauge Misc Inject 1 lancet as directed 4 (four) times daily.    [DISCONTINUED] metoprolol succinate (TOPROL-XL) 50 MG 24 hr tablet Take 1 tablet (50 mg total) by mouth once daily. 1 Tablet Extended Release 24 hr Oral Every day     Family History     Problem Relation (Age of Onset)    Diabetes Mother    Learning disabilities Sister        Social History Main Topics    Smoking status: Former Smoker     Quit date: 8/17/1956    Smokeless tobacco: Not on file    Alcohol use No    Drug use: No    Sexual activity: No     Review of Systems   Constitutional: Negative for chills and fever.   HENT: Negative for sore throat.    Eyes: Negative for visual disturbance.   Respiratory: Positive for cough and shortness of breath.    Cardiovascular: Positive for leg swelling. Negative for chest pain.   Gastrointestinal: Negative for nausea and vomiting.   Genitourinary: Negative for dysuria.   Musculoskeletal: Positive for arthralgias.        Left knee pain.   Skin: Negative for rash.   Neurological: Positive for weakness. Negative for syncope.   Psychiatric/Behavioral: The patient is not nervous/anxious.      Objective:     Vital Signs (Most Recent):  Temp: 98.6 °F (37 °C) (03/27/17 1246)  Pulse: 67 (03/27/17 1246)  Resp: 18 (03/27/17 1246)  BP: (!) 199/92 (03/27/17 1246)  SpO2: (!) 94 % (03/27/17 1246) Vital " Signs (24h Range):  Temp:  [98.2 °F (36.8 °C)-98.6 °F (37 °C)] 98.6 °F (37 °C)  Pulse:  [56-67] 67  Resp:  [18-20] 18  SpO2:  [84 %-100 %] 94 %  BP: (134-199)/(74-92) 199/92     Weight: 87.1 kg (192 lb)  Body mass index is 30.99 kg/(m^2).    Physical Exam   Constitutional: She appears well-developed and well-nourished.   Eyes: EOM are normal. Pupils are equal, round, and reactive to light.   Neck: Normal range of motion.   Cardiovascular: Normal rate and regular rhythm.    Pulmonary/Chest:   Course with bilateral wheezing and rhonchi.   Abdominal: Soft. Bowel sounds are normal.   Musculoskeletal:   Left knee with mild swelling but no erythema.   Neurological:   Awake and alert, oriented x 2.   Skin: Skin is warm.   Left heel pressure sore.        Significant Labs: All pertinent labs within the past 24 hours have been reviewed.    Significant Imaging: I have reviewed and interpreted all pertinent imaging results/findings within the past 24 hours.

## 2017-03-27 NOTE — H&P
[facility] Medicine  History & Physical    Patient Name: Maria D Rodrigues  MRN: [id number]  Admission Date: 3/27/2017  Attending Physician: Sisi Salas MD  Primary Care Provider: Carla Guerrero MD         Patient information was obtained from patient, caregiver / friend, past medical records and ER records.     Subjective:     Principal Problem:Pneumonia of both lungs due to infectious organism    Chief Complaint:   Chief Complaint   Patient presents with    Shortness of Breath     Patient was 84% on room air in triage.        HPI: 86 y/o female with dementia, HTN, HLP, uncontrolled DM2, CKD IV, and h/o CVA who presented with SOB. Daughter at the bedside reported she noticed that the patient had wheezing that started 2 days ago and then last night became more SOB. She had increasing cough, but she was unsure if the patient had any fever or chills. Earlier this month, patient was admitted for observation for influenza A and UTI. Pt patient is able to answer simple questions, but most of the history is obtained through the daughter who is the primary care giver. She also reported chronic left knee pain that limits patient's mobility. Pt does transfers and sits in chair but does not do much otherwise. In the ER, patient was noted to be hypoxic with sats of 84% in RA and workup was remarkable for probable infiltrates consistent with pneumonia.    Past Medical History:   Diagnosis Date    Chronic kidney disease     CKD (chronic kidney disease) stage 4, GFR 15-29 ml/min     Degenerative joint disease involving multiple joints     Dementia     Diabetes mellitus type II     insulin dependent    Diabetes mellitus with neuropathy     DNR (do not resuscitate)     HTN (hypertension)     Hyperlipidemia     Stroke     Thyroid disease     hypothyroidism    Thyroid nodule        Past Surgical History:   Procedure Laterality Date    APPENDECTOMY      BACK SURGERY      CATARACT  "EXTRACTION, BILATERAL       SECTION      CHOLECYSTECTOMY      HYSTERECTOMY         Review of patient's allergies indicates:  No Known Allergies    No current facility-administered medications on file prior to encounter.      Current Outpatient Prescriptions on File Prior to Encounter   Medication Sig    aspirin (ECOTRIN) 81 MG EC tablet Take 1 tablet by mouth Daily.     BD ULTRA-FINE LILIAN PEN NEEDLES 32 gauge x 5/32" Ndle use with insulin FOUR TIMES DAILY    calcium carbonate-vitamin D3 (CALTRATE 600 + D) 600 mg(1,500mg) -400 unit Chew Take 1 tablet by mouth once daily.     diaper,brief,adult,disposable Misc Change 5 times per day    DUREZOL 0.05 % Drop ophthalmic solution     gabapentin (NEURONTIN) 300 MG capsule TAKE ONE TABLET BY MOUTH TWICE DAILY    hydrALAZINE (APRESOLINE) 25 MG tablet Take 1 tablet (25 mg total) by mouth every 12 (twelve) hours.    insulin glargine (LANTUS SOLOSTAR) 100 unit/mL (3 mL) InPn pen Inject 20 Units into the skin every evening.    latanoprost 0.005 % ophthalmic solution     levothyroxine (SYNTHROID) 50 MCG tablet Take 1 tablet (50 mcg total) by mouth before breakfast.    metoprolol tartrate (LOPRESSOR) 100 MG tablet Take 1 tablet (100 mg total) by mouth 2 (two) times daily.    NOVOLOG FLEXPEN 100 unit/mL InPn pen INJECT 15 UNITS UNDER THE SKIN THREE TIMES DAILY WITH MEALS    omega 3-dha-epa-fish oil (FISH OIL) 100-160-1,000 mg Cap Take 1 tablet by mouth Daily.     paroxetine (PAXIL) 20 MG tablet Take 1 tablet (20 mg total) by mouth every morning.    potassium chloride SA (K-DUR,KLOR-CON) 10 MEQ tablet TAKE ONE TABLET BY MOUTH ONCE DAILY FOR POTASSIUM    pravastatin (PRAVACHOL) 20 MG tablet Take 1 tablet (20 mg total) by mouth once daily.    SURE COMFORT INSULIN SYRINGE 1/2 mL 31 x 5/16" Syrg USE ALONG WITH INSULIN FOUR TIMES DAILY    valsartan (DIOVAN) 160 MG tablet Take 1 tablet (160 mg total) by mouth once daily.    walker (ULTRA-LIGHT ROLLATOR) Misc 1 " Units by Misc.(Non-Drug; Combo Route) route once daily.    [DISCONTINUED] lancets 30 gauge Misc Inject 1 lancet as directed 4 (four) times daily.    [DISCONTINUED] metoprolol succinate (TOPROL-XL) 50 MG 24 hr tablet Take 1 tablet (50 mg total) by mouth once daily. 1 Tablet Extended Release 24 hr Oral Every day     Family History     Problem Relation (Age of Onset)    Diabetes Mother    Learning disabilities Sister        Social History Main Topics    Smoking status: Former Smoker     Quit date: 8/17/1956    Smokeless tobacco: Not on file    Alcohol use No    Drug use: No    Sexual activity: No     Review of Systems   Constitutional: Negative for chills and fever.   HENT: Negative for sore throat.    Eyes: Negative for visual disturbance.   Respiratory: Positive for cough and shortness of breath.    Cardiovascular: Positive for leg swelling. Negative for chest pain.   Gastrointestinal: Negative for nausea and vomiting.   Genitourinary: Negative for dysuria.   Musculoskeletal: Positive for arthralgias.        Left knee pain.   Skin: Negative for rash.   Neurological: Positive for weakness. Negative for syncope.   Psychiatric/Behavioral: The patient is not nervous/anxious.      Objective:     Vital Signs (Most Recent):  Temp: 98.6 °F (37 °C) (03/27/17 1246)  Pulse: 67 (03/27/17 1246)  Resp: 18 (03/27/17 1246)  BP: (!) 199/92 (03/27/17 1246)  SpO2: (!) 94 % (03/27/17 1246) Vital Signs (24h Range):  Temp:  [98.2 °F (36.8 °C)-98.6 °F (37 °C)] 98.6 °F (37 °C)  Pulse:  [56-67] 67  Resp:  [18-20] 18  SpO2:  [84 %-100 %] 94 %  BP: (134-199)/(74-92) 199/92     Weight: 87.1 kg (192 lb)  Body mass index is 30.99 kg/(m^2).    Physical Exam   Constitutional: She appears well-developed and well-nourished.   Eyes: EOM are normal. Pupils are equal, round, and reactive to light.   Neck: Normal range of motion.   Cardiovascular: Normal rate and regular rhythm.    Pulmonary/Chest:   Course with bilateral wheezing and rhonchi.    Abdominal: Soft. Bowel sounds are normal.   Musculoskeletal:   Left knee with mild swelling but no erythema.   Neurological:   Awake and alert, oriented x 2.   Skin: Skin is warm.   Left heel pressure sore.        Significant Labs: All pertinent labs within the past 24 hours have been reviewed.    Significant Imaging: I have reviewed and interpreted all pertinent imaging results/findings within the past 24 hours.    Assessment/Plan:     * Pneumonia of both lungs due to infectious organism  Pt with recent admission of influenza with appropriate treatment earlier this month, now with CXR concerning for pneumonia, continue empiric broad spectrum antibiotics and f/u with cultures, and add NEBS and titrate O2 as tolerated.    Hypertension  Continue home medication regimen.    Hyperlipidemia  Continue pravastatin.    Hypothyroidism  Continue levothyroxine.    Degenerative joint disease involving multiple joints  Left knee pain  Assessment and Plan:  Will check x-ray of knee but likely DDD only. Pain control.    CKD Stage IV  Creatinine approximately at baseline, will monitor.    Ischemic cerebrovascular accident (CVA) of frontal lobe  Continue ASA and statin.    Anemia of chronic disease  H/H consistent with previous levels, no reported bleeding, monitor.    Leukocytosis  Secondary to pneumonia, will monitor.    Moderate protein malnutrition  Will supplement with Boost glucose control.    H/O: CVA (cerebrovascular accident)  Continue ASA and statin.    Debility  Pt can do transfers but not really able to walk, will consult PT/OT.     Type 2 diabetes mellitus, uncontrolled, with renal complications  Closely monitor blood glucose and make adjustments to insulin regimen as necessary. Check HgbA1C if not already done.    DNR (do not resuscitate)  DNR confirmed with patient and family at the bedside. Will consult palliative care to address future goals of care issues and get LaPOST if not done.      VTE Risk Mitigation          Ordered     enoxaparin injection 30 mg  Daily     Route:  Subcutaneous        03/27/17 0818     Medium Risk of VTE  Once      03/27/17 0818     Place sequential compression device  Until discontinued      03/27/17 0818     Place YON hose  Until discontinued      03/27/17 0818        Sisi Salas MD  Department of Hospital Medicine   [facility]

## 2017-03-28 LAB
ANION GAP SERPL CALC-SCNC: 11 MMOL/L
BASOPHILS # BLD AUTO: 0.07 K/UL
BASOPHILS NFR BLD: 0.6 %
BUN SERPL-MCNC: 31 MG/DL
CALCIUM SERPL-MCNC: 9 MG/DL
CHLORIDE SERPL-SCNC: 105 MMOL/L
CO2 SERPL-SCNC: 25 MMOL/L
CREAT SERPL-MCNC: 1.9 MG/DL
DIFFERENTIAL METHOD: ABNORMAL
EOSINOPHIL # BLD AUTO: 0.2 K/UL
EOSINOPHIL NFR BLD: 1.5 %
ERYTHROCYTE [DISTWIDTH] IN BLOOD BY AUTOMATED COUNT: 15.8 %
EST. GFR  (AFRICAN AMERICAN): 27 ML/MIN/1.73 M^2
EST. GFR  (NON AFRICAN AMERICAN): 24 ML/MIN/1.73 M^2
GLUCOSE SERPL-MCNC: 238 MG/DL
HCT VFR BLD AUTO: 34.7 %
HGB BLD-MCNC: 10.5 G/DL
LYMPHOCYTES # BLD AUTO: 0.8 K/UL
LYMPHOCYTES NFR BLD: 6.3 %
MCH RBC QN AUTO: 29.7 PG
MCHC RBC AUTO-ENTMCNC: 30.3 %
MCV RBC AUTO: 98 FL
MONOCYTES # BLD AUTO: 2.5 K/UL
MONOCYTES NFR BLD: 20.1 %
NEUTROPHILS # BLD AUTO: 8.9 K/UL
NEUTROPHILS NFR BLD: 71.5 %
PLATELET # BLD AUTO: 232 K/UL
PLATELET BLD QL SMEAR: ABNORMAL
PMV BLD AUTO: 10.7 FL
POCT GLUCOSE: 237 MG/DL (ref 70–110)
POCT GLUCOSE: 246 MG/DL (ref 70–110)
POCT GLUCOSE: 259 MG/DL (ref 70–110)
POCT GLUCOSE: 281 MG/DL (ref 70–110)
POTASSIUM SERPL-SCNC: 4 MMOL/L
RBC # BLD AUTO: 3.53 M/UL
SODIUM SERPL-SCNC: 141 MMOL/L
VANCOMYCIN SERPL-MCNC: 8.3 UG/ML
WBC # BLD AUTO: 12.58 K/UL

## 2017-03-28 PROCEDURE — 63600175 PHARM REV CODE 636 W HCPCS: Performed by: HOSPITALIST

## 2017-03-28 PROCEDURE — 85025 COMPLETE CBC W/AUTO DIFF WBC: CPT

## 2017-03-28 PROCEDURE — 25000003 PHARM REV CODE 250: Performed by: HOSPITALIST

## 2017-03-28 PROCEDURE — 63600175 PHARM REV CODE 636 W HCPCS: Performed by: INTERNAL MEDICINE

## 2017-03-28 PROCEDURE — 25000242 PHARM REV CODE 250 ALT 637 W/ HCPCS: Performed by: HOSPITALIST

## 2017-03-28 PROCEDURE — 36415 COLL VENOUS BLD VENIPUNCTURE: CPT

## 2017-03-28 PROCEDURE — 12000002 HC ACUTE/MED SURGE SEMI-PRIVATE ROOM

## 2017-03-28 PROCEDURE — 94761 N-INVAS EAR/PLS OXIMETRY MLT: CPT

## 2017-03-28 PROCEDURE — 25000003 PHARM REV CODE 250: Performed by: INTERNAL MEDICINE

## 2017-03-28 PROCEDURE — 80202 ASSAY OF VANCOMYCIN: CPT

## 2017-03-28 PROCEDURE — 63600175 PHARM REV CODE 636 W HCPCS: Performed by: EMERGENCY MEDICINE

## 2017-03-28 PROCEDURE — 27000221 HC OXYGEN, UP TO 24 HOURS

## 2017-03-28 PROCEDURE — 94640 AIRWAY INHALATION TREATMENT: CPT

## 2017-03-28 PROCEDURE — 25000003 PHARM REV CODE 250: Performed by: EMERGENCY MEDICINE

## 2017-03-28 PROCEDURE — 80048 BASIC METABOLIC PNL TOTAL CA: CPT

## 2017-03-28 RX ORDER — OLANZAPINE 10 MG/2ML
10 INJECTION, POWDER, FOR SOLUTION INTRAMUSCULAR ONCE AS NEEDED
Status: COMPLETED | OUTPATIENT
Start: 2017-03-28 | End: 2017-03-28

## 2017-03-28 RX ORDER — FUROSEMIDE 10 MG/ML
80 INJECTION INTRAMUSCULAR; INTRAVENOUS ONCE
Status: COMPLETED | OUTPATIENT
Start: 2017-03-28 | End: 2017-03-28

## 2017-03-28 RX ADMIN — INSULIN ASPART 6 UNITS: 100 INJECTION, SOLUTION INTRAVENOUS; SUBCUTANEOUS at 08:03

## 2017-03-28 RX ADMIN — PIPERACILLIN SODIUM,TAZOBACTAM SODIUM 4.5 G: 4; .5 INJECTION, POWDER, FOR SOLUTION INTRAVENOUS at 05:03

## 2017-03-28 RX ADMIN — ALBUTEROL SULFATE 2.5 MG: 2.5 SOLUTION RESPIRATORY (INHALATION) at 01:03

## 2017-03-28 RX ADMIN — METOPROLOL TARTRATE 100 MG: 50 TABLET ORAL at 11:03

## 2017-03-28 RX ADMIN — INSULIN ASPART 10 UNITS: 100 INJECTION, SOLUTION INTRAVENOUS; SUBCUTANEOUS at 08:03

## 2017-03-28 RX ADMIN — HYDRALAZINE HYDROCHLORIDE 25 MG: 25 TABLET ORAL at 11:03

## 2017-03-28 RX ADMIN — OLANZAPINE 10 MG: 10 INJECTION, POWDER, FOR SOLUTION INTRAMUSCULAR at 03:03

## 2017-03-28 RX ADMIN — METOPROLOL TARTRATE 100 MG: 50 TABLET ORAL at 08:03

## 2017-03-28 RX ADMIN — INSULIN ASPART 10 UNITS: 100 INJECTION, SOLUTION INTRAVENOUS; SUBCUTANEOUS at 01:03

## 2017-03-28 RX ADMIN — LEVOTHYROXINE SODIUM 50 MCG: 50 TABLET ORAL at 05:03

## 2017-03-28 RX ADMIN — INSULIN ASPART 4 UNITS: 100 INJECTION, SOLUTION INTRAVENOUS; SUBCUTANEOUS at 01:03

## 2017-03-28 RX ADMIN — PRAVASTATIN SODIUM 20 MG: 10 TABLET ORAL at 11:03

## 2017-03-28 RX ADMIN — ASPIRIN 81 MG: 81 TABLET, COATED ORAL at 11:03

## 2017-03-28 RX ADMIN — FUROSEMIDE 80 MG: 10 INJECTION, SOLUTION INTRAMUSCULAR; INTRAVENOUS at 09:03

## 2017-03-28 RX ADMIN — INSULIN ASPART 10 UNITS: 100 INJECTION, SOLUTION INTRAVENOUS; SUBCUTANEOUS at 04:03

## 2017-03-28 RX ADMIN — PANTOPRAZOLE SODIUM 40 MG: 40 TABLET, DELAYED RELEASE ORAL at 11:03

## 2017-03-28 RX ADMIN — ALBUTEROL SULFATE 2.5 MG: 2.5 SOLUTION RESPIRATORY (INHALATION) at 09:03

## 2017-03-28 RX ADMIN — ENOXAPARIN SODIUM 30 MG: 100 INJECTION SUBCUTANEOUS at 04:03

## 2017-03-28 RX ADMIN — INSULIN ASPART 6 UNITS: 100 INJECTION, SOLUTION INTRAVENOUS; SUBCUTANEOUS at 04:03

## 2017-03-28 RX ADMIN — VALSARTAN 160 MG: 80 TABLET, FILM COATED ORAL at 11:03

## 2017-03-28 RX ADMIN — VANCOMYCIN HYDROCHLORIDE 1250 MG: 1 INJECTION, POWDER, LYOPHILIZED, FOR SOLUTION INTRAVENOUS at 08:03

## 2017-03-28 RX ADMIN — HYDRALAZINE HYDROCHLORIDE 25 MG: 25 TABLET ORAL at 08:03

## 2017-03-28 RX ADMIN — ALBUTEROL SULFATE 2.5 MG: 2.5 SOLUTION RESPIRATORY (INHALATION) at 07:03

## 2017-03-28 NOTE — PLAN OF CARE
Problem: Fall Risk (Adult)  Intervention: Monitor/Assist with Self Care    17 1509   Activity   Activity Assistance Provided assistance, 2 people   Daily Care Interventions   Self-Care Promotion independence encouraged       Intervention: Reduce Risk/Promote Restraint Free Environment    17 150   Prevent [place] Drop/Fall   Safety/Security Measures bed alarm set   Safety Interventions   Environmental Safety Modification clutter free environment maintained;lighting adjusted       Intervention: Review Medications/Identify Contributors to Fall Risk    17 1509   Safety Interventions   Medication Review/Management medications reviewed;high risk medications identified       Intervention: Patient Rounds    17 1400   Safety Interventions   Patient Rounds ID band on;placement of personal items at bedside;toileting offered;visualized patient;clutter free environment maintained;call light in reach;bed wheels locked;bed in low position       Intervention: Safety Promotion/Fall Prevention    17 1400   Safety Interventions   Safety Promotion/Fall Prevention bed alarm set;Fall Risk reviewed with patient/family;side rails raised x 3;nonskid shoes/socks when out of bed;lighting adjusted;/camera at bedside         Goal: Identify Related Risk Factors and Signs and Symptoms  Related risk factors and signs and symptoms are identified upon initiation of Human Response Clinical Practice Guideline (CPG)   Outcome: Ongoing (interventions implemented as appropriate)    17 1509   Fall Risk   Related Risk Factors (Fall Risk) gait/mobility problems;history of falls;polypharmacy;confusion/agitation;age-related changes   Signs and Symptoms (Fall Risk) presence of risk factors       Goal: Absence of Falls  Patient will demonstrate the desired outcomes by discharge/transition of care.   Outcome: Ongoing (interventions implemented as appropriate)    17 1509   Fall Risk (Adult)   Absence of  Falls making progress toward outcome         Problem: Pressure Ulcer Risk (Francisco Scale) (Adult,Obstetrics,Pediatric)  Intervention: Promote/Optimize Nutrition    03/28/17 1509   Nutrition Interventions   Oral Nutrition Promotion rest periods promoted       Intervention: Prevent/Manage Excess Moisture    03/28/17 1509   Hygiene Care   Perineal Care absorbent pad changed;diaper changed;perineum cleansed   Bathing/Skin Care incontinence care   Skin Interventions   Skin Protection incontinence pads utilized;tubing/devices free from skin contact;skin sealant/moisture barrier applied;pulse oximeter probe site changed       Intervention: Maintain Head of Bed Elevation Less Than 30 Degrees as Tolerated    03/28/17 1400   Positioning   Head of Bed (HOB) HOB at 30-45 degrees       Intervention: Prevent/Minimize Sheer/Friction Injuries    03/28/17 0715 03/28/17 1509   Skin Interventions   Pressure Reduction Devices heel offloading device utilized;positioning supports utilized --    Positioning   Positioning/Transfer Devices --  pillows       Intervention: Turn/Reposition Often    03/28/17 1400 03/28/17 1509   Skin Interventions   Pressure Reduction Techniques --  heels elevated off bed   Positioning   Body Position side-lying, left;foot of bed elevated --          Goal: Identify Related Risk Factors and Signs and Symptoms  Related risk factors and signs and symptoms are identified upon initiation of Human Response Clinical Practice Guideline (CPG)   Outcome: Ongoing (interventions implemented as appropriate)    03/28/17 1509   Pressure Ulcer Risk (Francisco Scale)   Related Risk Factors (Pressure Ulcer Risk (Francisco Scale)) age extremes;infection;nutritional deficiencies;fluid intake inadequate       Goal: Skin Integrity  Patient will demonstrate the desired outcomes by discharge/transition of care.   Outcome: Ongoing (interventions implemented as appropriate)    03/28/17 1509   Pressure Ulcer Risk (Francisco Scale)  (Adult,Obstetrics,Pediatric)   Skin Integrity making progress toward outcome         Problem: Pneumonia (Adult)  Intervention: Maximize Oxygenation/Ventilation/Perfusion    03/28/17 1400   Positioning   Head of Bed (HOB) HOB at 30-45 degrees       Intervention: Prevent/Manage Infection Progression    03/28/17 1509   Safety Interventions   Infection Prevention equipment surfaces disinfected;gylcemic control management;rest/sleep promoted;hydration promoted         Goal: Signs and Symptoms of Listed Potential Problems Will be Absent, Minimized or Managed (Pneumonia)  Signs and symptoms of listed potential problems will be absent, minimized or managed by discharge/transition of care (reference Pneumonia (Adult) CPG).  Outcome: Ongoing (interventions implemented as appropriate)    03/28/17 1509   Pneumonia   Problems Assessed (Pneumonia) all   Problems Present (Pneumonia) respiratory compromise;infection progression

## 2017-03-28 NOTE — CONSULTS
Consult Note  Palliative Care      Consult Requested By: Sisi Salas MD  Reason for Consult:       Advance Directives and code status    SUBJECTIVE:     History of Present Illness:   CC: Shortness of Breath     HPI: 85 y.o. F with HTN, HLD, DM w/ neuropathy, CKD, thyroid disease, and H/O stroke presents to the ED via c/o SOB beginning a few hours ago PTA with associated cough. SpO2 was 84% RA in triage. Pt states she was feeling fine 1 day prior to onset of symptoms. No prior tx. No hx of asthma, emphysema, or COPD. Pt denies fever, CP, abdominal pain, N/V, and any other symptoms.      The history is provided by the patient.     Patient was admitted with bilateral pneumonia, respiratory failure with hypoxia, and CKD.      Palliative Care has been consulted to discuss advance directives and code status.  Of note, patient was changed from full code to DNR on 3/27/17 (by Dr. Salas)      Past Medical History:   Diagnosis Date    Chronic kidney disease     CKD (chronic kidney disease) stage 4, GFR 15-29 ml/min     Degenerative joint disease involving multiple joints     Dementia     Diabetes mellitus type II     insulin dependent    Diabetes mellitus with neuropathy     DNR (do not resuscitate)     HTN (hypertension)     Hyperlipidemia     Stroke     Thyroid disease     hypothyroidism    Thyroid nodule      Past Surgical History:   Procedure Laterality Date    APPENDECTOMY      BACK SURGERY      CATARACT EXTRACTION, BILATERAL       SECTION      CHOLECYSTECTOMY      HYSTERECTOMY       Family History   Problem Relation Age of Onset    Learning disabilities Sister     Diabetes Mother      Social History   Substance Use Topics    Smoking status: Former Smoker     Quit date: 1956    Smokeless tobacco: None    Alcohol use No       Mental Status:  AAO x 4 (but did not know which hospital), conversant (rather simply) and follows commands.       Palliative Performance Score:   "40        OBJECTIVE:     Pain Assessment:   She denies pain.  She denies SOB at present.  Reports she cam to hospital with SOB.  She reports only source of pain is left knee - has been present for a long time (not sure when), is intermittent and precipitated by moving the knee "the wrong way" which when that occurs gives her a dull aching pain, rated 7/10, but it only lasts a few minutes and then resolves back to 0/10.  So she keeps the leg nearly motionless.  She is only able to take a few steps (with walker) and said she does not notice pain when she stands up - only if she does "twisting" motion to the left knee.  She mainly denies all other symptoms.  See MAR for medication regimen.      Decision-Making Capacity:  Patient able to express basic wishes and preferences.  States she involves her daughter and family in decisions about her health care.      Advanced Directives:  Living Will:   NO  Do Not Resuscitate Status:  DNR effective 3/27/17 by Dr. Salas.   Medical Power of :  NO        Living Arrangements:   Lives with her sister and nephew.      Psychosocial, Spiritual, Cultural: Amish.  Very strong sense of john.      Patient's most important priorities: "Just praising my Lord."      Patient's biggest concerns/fears: Denies worries, concerns, or fears.         Previous death/end of life care history: Friend "who was like a daughter" passed away within past year.        Patient's goals/hopes: Patient states her goal is "to get rid of this pneumonia and fix my knee."  She states she wants to return home upon discharge.  On further questioning, she says she does not like to be in the hospital and would prefer care at home.        ASSESSMENT/PLAN:     Patient sitting up in bed, AAO x 3 (at first stated she was in a "teaching place", then though she was at [facility] - easily reoriented to this hospital.  She is trying to feed herself lunch - requires some assistance to actually get the eating utensil up to her " "mouth and says she is able to mostly feed herself at home. (with assistance she ate all of her peaches, several bites of roast, a few bites of carrots, drank 1 cup of water).   She is quite pleasant, calm, and cooperative; easily conversant (simply) and follows commands.  She comments "they told me I was a handful earlier but I don't remember any of it."    She states her baseline:  Requires assistance for bathing, dressing, toileting, and ambulates very little with a walker (bed to chair).  She states recently she has had to wear depends/diaper for protection.  She denies pain or SOB at present.    Respiratory effort even and unlabored, lungs sound coarse bilaterally.  Heart tones are audible, regular, distant.  Abdomen soft, grossly obese, + bowel sounds.  Trace peripheral edema.  Vital signs:  Afebrile 97.4 oral temp, HR 51, /70, RR 20, sat 91% on 6L NC.      We discussed goals of care (to extent patient able) and she states she wants to return home, would like to avoid being in the hospital, wants "to get rid of this pneumonia" and "I want the pain in my knee fixed."  She also states she hopes she would be able to walk again.        We discussed code status, and patient reaffirms DNR. Sshe would not want CPR/life support.  She states she wants a peaceful passing when her time comes.      There was no family at bedside.  With patient's permission I will reach out to patient's daughter Marleni (an ER nurse at [employer]) to discuss goals of care.      Plan:  Educate.  Literature. Goals of care and code status discussion.  Support.  Consult .        Recommendations:   Continue supportive care.   ? Knee injection - her main complaint is knee pain.   Will contact daughter/family to discuss options (?HH with [facility], home Palliative NP visits)   DNR.   Needs LaPOST prior to discharge.     Consult Spiritual Care.  (done)   Palliative care will continue to follow as needed.       Thank you for this consult and " the opportunity to participate in Mrs. Rodrigues' care.     Virginia Lucero, BSN, RN, CCRN, CHPN   Palliative Care Nurse Coordinator   [facility]  [phone]      Time Spent:  90 minutes

## 2017-03-28 NOTE — PROGRESS NOTES
Patient awake and alert. 100% on non-rebreather. Switched patient back to 6L nasal cannula, oxygen sat 95%.

## 2017-03-28 NOTE — PLAN OF CARE
Pt remains free of falls and injury this shift. Pt agitated and pulling out lines after being turned, o2 sats decreased. MD notified, new order obtained, Zyprexa given IM for agitation. Iv antibiotics. Will continue to monitor. Avasys monitoring in room .

## 2017-03-28 NOTE — NURSING
Pt was not on stated tele box,tele notified of possibly error and tele initiated on box number [id number].

## 2017-03-28 NOTE — CONSULTS
Consults     85 yo with effusion L knee. Pt is a poor historian. Admitted for MMP's including pneumonia. Not septic. Pt denies pain in knee at present. No trauma. Hx of worn out knee's.     Exam- L knee mild effusion, no warmth , erythema, + varus , + crepitis, no surg scars. Rom 5-85 with min MJL pain. LLE NVI. AVSS    Xray - severe DJD, three compartment bone on bone, no fx's  Wbc- 12.6    A- L knee oa, non septic effusion    Rec- Ice and elevation on pillow.           If pain becomes an issue I would inject depo/ marcaine combo intra articular L knee.          Thank you for the consult.

## 2017-03-28 NOTE — PLAN OF CARE
Problem: Patient Care Overview  Goal: Plan of Care Review  Outcome: Ongoing (interventions implemented as appropriate)  Patient remains free from injury and falls. Turned every 2 hours with pillow support. Incontinence care provided as needed. Skin remains intact. Patient lethargic with low oxygen saturation at beginning of shift. Status improved. Patient awake and alert, respiratory status improved. Current oxygen status 92% via 6 liters nasal cannula. Patient oral intake poor. Ate 25% of meals. Avasys at bedside. Plan of care continued.

## 2017-03-28 NOTE — PROGRESS NOTES
The pt was received on 4 liters nasal cannula with a sat of 97%. No distress was noted at this time. Her breath sounds are diminished, slightly coarse. She tolerated her treatment with CHRIS.

## 2017-03-29 LAB
ALLENS TEST: ABNORMAL
ANION GAP SERPL CALC-SCNC: 9 MMOL/L
BASOPHILS # BLD AUTO: 0.04 K/UL
BASOPHILS NFR BLD: 0.4 %
BUN SERPL-MCNC: 34 MG/DL
CALCIUM SERPL-MCNC: 8.5 MG/DL
CHLORIDE SERPL-SCNC: 106 MMOL/L
CO2 SERPL-SCNC: 27 MMOL/L
CREAT SERPL-MCNC: 1.9 MG/DL
DELSYS: ABNORMAL
DIFFERENTIAL METHOD: ABNORMAL
EOSINOPHIL # BLD AUTO: 0.2 K/UL
EOSINOPHIL NFR BLD: 1.6 %
ERYTHROCYTE [DISTWIDTH] IN BLOOD BY AUTOMATED COUNT: 15.6 %
EST. GFR  (AFRICAN AMERICAN): 27 ML/MIN/1.73 M^2
EST. GFR  (NON AFRICAN AMERICAN): 24 ML/MIN/1.73 M^2
FLOW: 5.5
GLUCOSE SERPL-MCNC: 182 MG/DL
HCO3 UR-SCNC: 28.1 MMOL/L (ref 24–28)
HCT VFR BLD AUTO: 31 %
HGB BLD-MCNC: 9.6 G/DL
LYMPHOCYTES # BLD AUTO: 1.1 K/UL
LYMPHOCYTES NFR BLD: 10 %
MCH RBC QN AUTO: 29.7 PG
MCHC RBC AUTO-ENTMCNC: 31 %
MCV RBC AUTO: 96 FL
MODE: ABNORMAL
MONOCYTES # BLD AUTO: 2 K/UL
MONOCYTES NFR BLD: 19 %
NEUTROPHILS # BLD AUTO: 7.3 K/UL
NEUTROPHILS NFR BLD: 69.7 %
PCO2 BLDA: 46.1 MMHG (ref 35–45)
PH SMN: 7.39 [PH] (ref 7.35–7.45)
PLATELET # BLD AUTO: 216 K/UL
PMV BLD AUTO: 10.5 FL
PO2 BLDA: 66 MMHG (ref 80–100)
POC BE: 3 MMOL/L
POC SATURATED O2: 92 % (ref 95–100)
POC TCO2: 29 MMOL/L (ref 23–27)
POCT GLUCOSE: 229 MG/DL (ref 70–110)
POCT GLUCOSE: 229 MG/DL (ref 70–110)
POCT GLUCOSE: 232 MG/DL (ref 70–110)
POCT GLUCOSE: 238 MG/DL (ref 70–110)
POCT GLUCOSE: 304 MG/DL (ref 70–110)
POTASSIUM SERPL-SCNC: 3.6 MMOL/L
RBC # BLD AUTO: 3.23 M/UL
SAMPLE: ABNORMAL
SITE: ABNORMAL
SODIUM SERPL-SCNC: 142 MMOL/L
SP02: 95
VANCOMYCIN SERPL-MCNC: 16.1 UG/ML
WBC # BLD AUTO: 10.6 K/UL

## 2017-03-29 PROCEDURE — 94640 AIRWAY INHALATION TREATMENT: CPT

## 2017-03-29 PROCEDURE — 25000242 PHARM REV CODE 250 ALT 637 W/ HCPCS: Performed by: HOSPITALIST

## 2017-03-29 PROCEDURE — 99900035 HC TECH TIME PER 15 MIN (STAT)

## 2017-03-29 PROCEDURE — 25000003 PHARM REV CODE 250: Performed by: HOSPITALIST

## 2017-03-29 PROCEDURE — 80202 ASSAY OF VANCOMYCIN: CPT

## 2017-03-29 PROCEDURE — 12000002 HC ACUTE/MED SURGE SEMI-PRIVATE ROOM

## 2017-03-29 PROCEDURE — 63600175 PHARM REV CODE 636 W HCPCS: Performed by: EMERGENCY MEDICINE

## 2017-03-29 PROCEDURE — 36600 WITHDRAWAL OF ARTERIAL BLOOD: CPT

## 2017-03-29 PROCEDURE — 94761 N-INVAS EAR/PLS OXIMETRY MLT: CPT

## 2017-03-29 PROCEDURE — 25000003 PHARM REV CODE 250: Performed by: INTERNAL MEDICINE

## 2017-03-29 PROCEDURE — 27000221 HC OXYGEN, UP TO 24 HOURS

## 2017-03-29 PROCEDURE — 36415 COLL VENOUS BLD VENIPUNCTURE: CPT

## 2017-03-29 PROCEDURE — 63600175 PHARM REV CODE 636 W HCPCS: Performed by: HOSPITALIST

## 2017-03-29 PROCEDURE — 85025 COMPLETE CBC W/AUTO DIFF WBC: CPT

## 2017-03-29 PROCEDURE — 25000003 PHARM REV CODE 250: Performed by: EMERGENCY MEDICINE

## 2017-03-29 PROCEDURE — 82803 BLOOD GASES ANY COMBINATION: CPT

## 2017-03-29 PROCEDURE — 80048 BASIC METABOLIC PNL TOTAL CA: CPT

## 2017-03-29 RX ORDER — POTASSIUM CHLORIDE 20 MEQ/1
40 TABLET, EXTENDED RELEASE ORAL ONCE
Status: COMPLETED | OUTPATIENT
Start: 2017-03-29 | End: 2017-03-29

## 2017-03-29 RX ORDER — INSULIN ASPART 100 [IU]/ML
15 INJECTION, SOLUTION INTRAVENOUS; SUBCUTANEOUS
Status: DISCONTINUED | OUTPATIENT
Start: 2017-03-29 | End: 2017-04-01 | Stop reason: HOSPADM

## 2017-03-29 RX ORDER — FUROSEMIDE 10 MG/ML
40 INJECTION INTRAMUSCULAR; INTRAVENOUS 2 TIMES DAILY
Status: DISCONTINUED | OUTPATIENT
Start: 2017-03-29 | End: 2017-03-31

## 2017-03-29 RX ORDER — DOCUSATE SODIUM 100 MG/1
100 CAPSULE, LIQUID FILLED ORAL DAILY
Status: DISCONTINUED | OUTPATIENT
Start: 2017-03-29 | End: 2017-04-01 | Stop reason: HOSPADM

## 2017-03-29 RX ORDER — CLONIDINE HYDROCHLORIDE 0.1 MG/1
0.3 TABLET ORAL 3 TIMES DAILY PRN
Status: DISCONTINUED | OUTPATIENT
Start: 2017-03-29 | End: 2017-03-29

## 2017-03-29 RX ORDER — HYDRALAZINE HYDROCHLORIDE 20 MG/ML
10 INJECTION INTRAMUSCULAR; INTRAVENOUS EVERY 8 HOURS PRN
Status: DISCONTINUED | OUTPATIENT
Start: 2017-03-29 | End: 2017-04-01 | Stop reason: HOSPADM

## 2017-03-29 RX ORDER — HYDRALAZINE HYDROCHLORIDE 25 MG/1
50 TABLET, FILM COATED ORAL EVERY 12 HOURS
Status: DISCONTINUED | OUTPATIENT
Start: 2017-03-29 | End: 2017-03-30

## 2017-03-29 RX ORDER — FUROSEMIDE 10 MG/ML
40 INJECTION INTRAMUSCULAR; INTRAVENOUS ONCE
Status: COMPLETED | OUTPATIENT
Start: 2017-03-29 | End: 2017-03-29

## 2017-03-29 RX ORDER — METOPROLOL TARTRATE 50 MG/1
50 TABLET ORAL 2 TIMES DAILY
Status: DISCONTINUED | OUTPATIENT
Start: 2017-03-30 | End: 2017-03-31

## 2017-03-29 RX ADMIN — VALSARTAN 160 MG: 80 TABLET, FILM COATED ORAL at 10:03

## 2017-03-29 RX ADMIN — PRAVASTATIN SODIUM 20 MG: 10 TABLET ORAL at 10:03

## 2017-03-29 RX ADMIN — PIPERACILLIN SODIUM,TAZOBACTAM SODIUM 4.5 G: 4; .5 INJECTION, POWDER, FOR SOLUTION INTRAVENOUS at 06:03

## 2017-03-29 RX ADMIN — HYDRALAZINE HYDROCHLORIDE 50 MG: 25 TABLET ORAL at 09:03

## 2017-03-29 RX ADMIN — ASPIRIN 81 MG: 81 TABLET, COATED ORAL at 10:03

## 2017-03-29 RX ADMIN — FUROSEMIDE 40 MG: 10 INJECTION, SOLUTION INTRAMUSCULAR; INTRAVENOUS at 10:03

## 2017-03-29 RX ADMIN — ALBUTEROL SULFATE 2.5 MG: 2.5 SOLUTION RESPIRATORY (INHALATION) at 08:03

## 2017-03-29 RX ADMIN — INSULIN ASPART 8 UNITS: 100 INJECTION, SOLUTION INTRAVENOUS; SUBCUTANEOUS at 01:03

## 2017-03-29 RX ADMIN — ENOXAPARIN SODIUM 30 MG: 100 INJECTION SUBCUTANEOUS at 05:03

## 2017-03-29 RX ADMIN — CLONIDINE HYDROCHLORIDE 0.3 MG: 0.1 TABLET ORAL at 01:03

## 2017-03-29 RX ADMIN — INSULIN ASPART 15 UNITS: 100 INJECTION, SOLUTION INTRAVENOUS; SUBCUTANEOUS at 09:03

## 2017-03-29 RX ADMIN — LEVOTHYROXINE SODIUM 50 MCG: 50 TABLET ORAL at 06:03

## 2017-03-29 RX ADMIN — HYDRALAZINE HYDROCHLORIDE 50 MG: 25 TABLET ORAL at 10:03

## 2017-03-29 RX ADMIN — PAROXETINE HYDROCHLORIDE HEMIHYDRATE 20 MG: 20 TABLET, FILM COATED ORAL at 06:03

## 2017-03-29 RX ADMIN — INSULIN ASPART 4 UNITS: 100 INJECTION, SOLUTION INTRAVENOUS; SUBCUTANEOUS at 06:03

## 2017-03-29 RX ADMIN — FUROSEMIDE 40 MG: 10 INJECTION, SOLUTION INTRAMUSCULAR; INTRAVENOUS at 07:03

## 2017-03-29 RX ADMIN — METOPROLOL TARTRATE 75 MG: 25 TABLET ORAL at 10:03

## 2017-03-29 RX ADMIN — INSULIN ASPART 15 UNITS: 100 INJECTION, SOLUTION INTRAVENOUS; SUBCUTANEOUS at 06:03

## 2017-03-29 RX ADMIN — POTASSIUM CHLORIDE 40 MEQ: 1500 TABLET, EXTENDED RELEASE ORAL at 10:03

## 2017-03-29 RX ADMIN — INSULIN ASPART 15 UNITS: 100 INJECTION, SOLUTION INTRAVENOUS; SUBCUTANEOUS at 01:03

## 2017-03-29 RX ADMIN — INSULIN DETEMIR 15 UNITS: 100 INJECTION, SOLUTION SUBCUTANEOUS at 09:03

## 2017-03-29 RX ADMIN — INSULIN ASPART 4 UNITS: 100 INJECTION, SOLUTION INTRAVENOUS; SUBCUTANEOUS at 09:03

## 2017-03-29 RX ADMIN — ALBUTEROL SULFATE 2.5 MG: 2.5 SOLUTION RESPIRATORY (INHALATION) at 03:03

## 2017-03-29 RX ADMIN — PANTOPRAZOLE SODIUM 40 MG: 40 TABLET, DELAYED RELEASE ORAL at 10:03

## 2017-03-29 RX ADMIN — DOCUSATE SODIUM 100 MG: 100 CAPSULE, LIQUID FILLED ORAL at 09:03

## 2017-03-29 NOTE — PLAN OF CARE
Problem: Patient Care Overview  Goal: Plan of Care Review  Outcome: Ongoing (interventions implemented as appropriate)  Pt has remained free from falls or signs of infection this shift. Pt has had no complaints of pain. Will continue to monitor.

## 2017-03-29 NOTE — NURSING
pts SBP continued to rise, MD paged and orders followed. Pt has remained oriented and cooperative with denial of pain.

## 2017-03-29 NOTE — PLAN OF CARE
03/29/17 1706   Discharge Assessment   Assessment Type Discharge Planning Assessment   Confirmed/corrected address and phone number on facesheet? Yes   Assessment information obtained from? Caregiver   Communicated expected length of stay with patient/caregiver yes   Type of Healthcare Directive Received LaPOST   If Healthcare Directive is received, is it scanned into Epic? no (comment)  (To be scanned into EPIC)   Prior to hospitilization cognitive status: Alert/Oriented   Prior to hospitalization functional status: Needs Assistance;Assistive Equipment   Current cognitive status: Alert/Oriented   Current Functional Status: Assistive Equipment;Needs Assistance   Arrived From [facility]   Lives With child(fernanda), adult   Able to Return to Prior Arrangements yes   Is patient able to care for self after discharge? No   How many people do you have in your home that can help with your care after discharge? 1   Who are your caregiver(s) and their phone number(s)? Daughter Marleni  [phone]   Patient's perception of discharge disposition home health   Readmission Within The Last 30 Days current reason for admission unrelated to previous admission   Patient currently being followed by outpatient case management? No   Patient currently receives home health services? Yes   Patient previously received home health services and would like to resume services if necessary? Yes   If yes, name of home health provider: [facility]   Does the patient currently use HME? Yes   Patient currently receives private duty nursing? No   Patient currently receives any other outside agency services? No   Do you have any problems affording any of your prescribed medications? No   Is the patient taking medications as prescribed? yes   Do you have any financial concerns preventing you from receiving the healthcare you need? No   Does the patient have transportation to healthcare appointments? Yes   Transportation Available family or  friend will provide   On Dialysis? No   Does the patient receive services at the Coumadin Clinic? No   Are there any open cases? No   Discharge Plan A Home with family;Home Health  ([facility])   Patient/Family In Agreement With Plan yes   Lyric cornelius LMSW, ACM

## 2017-03-29 NOTE — NURSING
Spoke to Dr Salas regarding bradycardia in 40's. States hold Evening Lopressor . She will make adjustment to meds

## 2017-03-29 NOTE — PROGRESS NOTES
The pt was received on 6 liters nasal cannula with a sat of 92%. Her respirations are mild. She has coarse crackles. She tolerated her treatment with CHRIS.

## 2017-03-29 NOTE — NURSING
Called and spoke to Dr. Salas concerning the patient's family concerns regarding the patient's heart rate and blood sugar. Daughter stated that the patient's heart rate was 45 when she came in this morning. Our records show the patient's heart rate in the 50's. Patient is receiving lopressor. Family also has concerns about the patient's blood sugar levels being over 200. Family is requesting that the home insulin regimen be followed. Dr. Salas stated that she will address the issues.

## 2017-03-30 PROBLEM — G93.41 ENCEPHALOPATHY, METABOLIC: Status: ACTIVE | Noted: 2017-03-30

## 2017-03-30 LAB
ANION GAP SERPL CALC-SCNC: 11 MMOL/L
BUN SERPL-MCNC: 35 MG/DL
CALCIUM SERPL-MCNC: 9.3 MG/DL
CHLORIDE SERPL-SCNC: 105 MMOL/L
CO2 SERPL-SCNC: 29 MMOL/L
CREAT SERPL-MCNC: 1.9 MG/DL
EST. GFR  (AFRICAN AMERICAN): 27 ML/MIN/1.73 M^2
EST. GFR  (NON AFRICAN AMERICAN): 24 ML/MIN/1.73 M^2
GLUCOSE SERPL-MCNC: 152 MG/DL
POCT GLUCOSE: 126 MG/DL (ref 70–110)
POCT GLUCOSE: 175 MG/DL (ref 70–110)
POCT GLUCOSE: 197 MG/DL (ref 70–110)
POCT GLUCOSE: 305 MG/DL (ref 70–110)
POTASSIUM SERPL-SCNC: 3.6 MMOL/L
SODIUM SERPL-SCNC: 145 MMOL/L
T4 FREE SERPL-MCNC: 0.88 NG/DL
TSH SERPL DL<=0.005 MIU/L-ACNC: 10.96 UIU/ML

## 2017-03-30 PROCEDURE — 84443 ASSAY THYROID STIM HORMONE: CPT

## 2017-03-30 PROCEDURE — 25000003 PHARM REV CODE 250: Performed by: EMERGENCY MEDICINE

## 2017-03-30 PROCEDURE — 25000003 PHARM REV CODE 250: Performed by: HOSPITALIST

## 2017-03-30 PROCEDURE — 63600175 PHARM REV CODE 636 W HCPCS: Performed by: HOSPITALIST

## 2017-03-30 PROCEDURE — 94761 N-INVAS EAR/PLS OXIMETRY MLT: CPT

## 2017-03-30 PROCEDURE — 36415 COLL VENOUS BLD VENIPUNCTURE: CPT

## 2017-03-30 PROCEDURE — 25000003 PHARM REV CODE 250: Performed by: INTERNAL MEDICINE

## 2017-03-30 PROCEDURE — 27000221 HC OXYGEN, UP TO 24 HOURS

## 2017-03-30 PROCEDURE — 25000242 PHARM REV CODE 250 ALT 637 W/ HCPCS: Performed by: HOSPITALIST

## 2017-03-30 PROCEDURE — 94640 AIRWAY INHALATION TREATMENT: CPT

## 2017-03-30 PROCEDURE — 80048 BASIC METABOLIC PNL TOTAL CA: CPT

## 2017-03-30 PROCEDURE — 63600175 PHARM REV CODE 636 W HCPCS: Performed by: EMERGENCY MEDICINE

## 2017-03-30 PROCEDURE — 12000002 HC ACUTE/MED SURGE SEMI-PRIVATE ROOM

## 2017-03-30 PROCEDURE — 84439 ASSAY OF FREE THYROXINE: CPT

## 2017-03-30 RX ORDER — HYDRALAZINE HYDROCHLORIDE 25 MG/1
50 TABLET, FILM COATED ORAL ONCE
Status: COMPLETED | OUTPATIENT
Start: 2017-03-30 | End: 2017-03-30

## 2017-03-30 RX ORDER — BISACODYL 10 MG
10 SUPPOSITORY, RECTAL RECTAL DAILY PRN
Status: DISCONTINUED | OUTPATIENT
Start: 2017-03-30 | End: 2017-04-01 | Stop reason: HOSPADM

## 2017-03-30 RX ORDER — LEVOTHYROXINE SODIUM 75 UG/1
75 TABLET ORAL
Status: DISCONTINUED | OUTPATIENT
Start: 2017-03-31 | End: 2017-04-01 | Stop reason: HOSPADM

## 2017-03-30 RX ORDER — HYDRALAZINE HYDROCHLORIDE 25 MG/1
25 TABLET, FILM COATED ORAL ONCE
Status: COMPLETED | OUTPATIENT
Start: 2017-03-30 | End: 2017-03-30

## 2017-03-30 RX ORDER — HYDRALAZINE HYDROCHLORIDE 25 MG/1
75 TABLET, FILM COATED ORAL EVERY 12 HOURS
Status: DISCONTINUED | OUTPATIENT
Start: 2017-03-30 | End: 2017-04-01 | Stop reason: HOSPADM

## 2017-03-30 RX ADMIN — INSULIN ASPART 15 UNITS: 100 INJECTION, SOLUTION INTRAVENOUS; SUBCUTANEOUS at 01:03

## 2017-03-30 RX ADMIN — HYDRALAZINE HYDROCHLORIDE 25 MG: 25 TABLET ORAL at 10:03

## 2017-03-30 RX ADMIN — INSULIN ASPART 2 UNITS: 100 INJECTION, SOLUTION INTRAVENOUS; SUBCUTANEOUS at 09:03

## 2017-03-30 RX ADMIN — INSULIN ASPART 8 UNITS: 100 INJECTION, SOLUTION INTRAVENOUS; SUBCUTANEOUS at 01:03

## 2017-03-30 RX ADMIN — FUROSEMIDE 40 MG: 10 INJECTION, SOLUTION INTRAMUSCULAR; INTRAVENOUS at 06:03

## 2017-03-30 RX ADMIN — HYDRALAZINE HYDROCHLORIDE 75 MG: 25 TABLET ORAL at 10:03

## 2017-03-30 RX ADMIN — INSULIN DETEMIR 15 UNITS: 100 INJECTION, SOLUTION SUBCUTANEOUS at 10:03

## 2017-03-30 RX ADMIN — METOPROLOL TARTRATE 50 MG: 50 TABLET ORAL at 09:03

## 2017-03-30 RX ADMIN — FUROSEMIDE 40 MG: 10 INJECTION, SOLUTION INTRAMUSCULAR; INTRAVENOUS at 09:03

## 2017-03-30 RX ADMIN — HYDRALAZINE HYDROCHLORIDE 50 MG: 25 TABLET ORAL at 01:03

## 2017-03-30 RX ADMIN — DOCUSATE SODIUM 100 MG: 100 CAPSULE, LIQUID FILLED ORAL at 09:03

## 2017-03-30 RX ADMIN — ASPIRIN 81 MG: 81 TABLET, COATED ORAL at 09:03

## 2017-03-30 RX ADMIN — PANTOPRAZOLE SODIUM 40 MG: 40 TABLET, DELAYED RELEASE ORAL at 09:03

## 2017-03-30 RX ADMIN — VALSARTAN 160 MG: 80 TABLET, FILM COATED ORAL at 09:03

## 2017-03-30 RX ADMIN — PAROXETINE HYDROCHLORIDE HEMIHYDRATE 20 MG: 20 TABLET, FILM COATED ORAL at 09:03

## 2017-03-30 RX ADMIN — ALBUTEROL SULFATE 2.5 MG: 2.5 SOLUTION RESPIRATORY (INHALATION) at 01:03

## 2017-03-30 RX ADMIN — ENOXAPARIN SODIUM 30 MG: 100 INJECTION SUBCUTANEOUS at 06:03

## 2017-03-30 RX ADMIN — ALBUTEROL SULFATE 2.5 MG: 2.5 SOLUTION RESPIRATORY (INHALATION) at 08:03

## 2017-03-30 RX ADMIN — HYDRALAZINE HYDROCHLORIDE 10 MG: 20 INJECTION INTRAMUSCULAR; INTRAVENOUS at 06:03

## 2017-03-30 RX ADMIN — INSULIN ASPART 15 UNITS: 100 INJECTION, SOLUTION INTRAVENOUS; SUBCUTANEOUS at 06:03

## 2017-03-30 RX ADMIN — PRAVASTATIN SODIUM 20 MG: 10 TABLET ORAL at 09:03

## 2017-03-30 RX ADMIN — ALBUTEROL SULFATE 2.5 MG: 2.5 SOLUTION RESPIRATORY (INHALATION) at 07:03

## 2017-03-30 RX ADMIN — BISACODYL 10 MG: 10 SUPPOSITORY RECTAL at 11:03

## 2017-03-30 RX ADMIN — PIPERACILLIN SODIUM,TAZOBACTAM SODIUM 4.5 G: 4; .5 INJECTION, POWDER, FOR SOLUTION INTRAVENOUS at 06:03

## 2017-03-30 RX ADMIN — INSULIN ASPART 2 UNITS: 100 INJECTION, SOLUTION INTRAVENOUS; SUBCUTANEOUS at 06:03

## 2017-03-30 RX ADMIN — LEVOTHYROXINE SODIUM 50 MCG: 50 TABLET ORAL at 06:03

## 2017-03-30 NOTE — ASSESSMENT & PLAN NOTE
Pt can do transfers but not really able to walk, PT/OT. Family would like home health when ready for discharge. Not interested in placement.

## 2017-03-30 NOTE — PLAN OF CARE
Problem: Patient Care Overview  Goal: Plan of Care Review  Outcome: Ongoing (interventions implemented as appropriate)    03/29/17 1946   Coping/Psychosocial   Plan Of Care Reviewed With patient   Plan of care discussed with patient and daughter. Lopressor adjusted related bradycardia. Lasix 40 mg increased BId. Congestion to lungs remains present. Turned every 2 hours while in bed.Sat in chair for four hours

## 2017-03-30 NOTE — PHYSICIAN QUERY
PT Name: Maria D Rodrigues  MR #: [id number]     Physician Query Form - Documentation Clarification      CDS/: Antonia Rodas RN, CCDS               Contact information: [phone]    This form is a permanent document in the medical record.     Query Date: March 30, 2017    By submitting this query, we are merely seeking further clarification of documentation. Please utilize your independent clinical judgment when addressing the question(s) below.    The Medical record reflects the following:    Supporting Clinical Findings Location in Medical Record   Respiratory failure w/hypoxia  CKD  Positive for cough and sob    · Patient was admitted with bilateral pneumonia, respiratory failure with hypoxia, and CKD.    Respiratory compromise, infection progression   ED MD Note        Palliative Care CN 3/28      Nurse Care Plan 3/28   No respiratory distress at this time    Was admitted with respiratory distress with hypoxia due to pneumonia and poss volume overload status.     Patient continues to have some labored breathing but O2 sats 95% on 4L NC. Patient incontinent of urine   ED MD Note    Primary PN 3/29      Nurse Care Plan 3/27   RR: 18 - 24  HR: 51 - 71  Temp: 98.2 - 97.4  BP: 177/77 - 152/74 - 195/85    Oxygen:   Nonrebreather 8 lpm 100% sats      on 3/26 - 3/27  4 lpm nasal cannula  97 - 83% sats on 3/27 - 3/28  6-5 lpm nc                 89 - 93% sats on 3/28 - 3/30  5-3 lpm nc                 93 - 98% sats on 3/30   VS Flow Sheet              VS Flow Sheet   Pneumonia  Acute diastolic heart failure Primary PN 3/29                                                                            Doctor, Please clarify, Respiratory Failure:     (  ) Ruled In - specify acuity: (x  ) Acute; (  ) Chronic; (  ) Acute & Chronic; (  ) Undeterminable  (  ) Ruled out  (  ) Other: _________________  (  ) Undeterminable          Provider Use Only                                                                                                                                [  ] Clinically undetermined

## 2017-03-30 NOTE — SUBJECTIVE & OBJECTIVE
Interval History: The patient had no problems reported overnight except for an elevated blood pressure.    Review of Systems   Unable to perform ROS: Dementia (Minimally verbal during exam)     Objective:     Vital Signs (Most Recent):  Temp: 98 °F (36.7 °C) (03/30/17 0711)  Pulse: 60 (03/30/17 1352)  Resp: (!) 62 (03/30/17 1352)  BP: (!) 205/91 (03/30/17 1240)  SpO2: 98 % (03/30/17 1352) Vital Signs (24h Range):  Temp:  [97.4 °F (36.3 °C)-98 °F (36.7 °C)] 98 °F (36.7 °C)  Pulse:  [56-73] 60  Resp:  [18-62] 62  SpO2:  [92 %-98 %] 98 %  BP: (160-205)/(70-91) 205/91     Weight: 87.1 kg (192 lb)  Body mass index is 30.99 kg/(m^2).    Intake/Output Summary (Last 24 hours) at 03/30/17 1456  Last data filed at 03/30/17 0531   Gross per 24 hour   Intake              320 ml   Output                0 ml   Net              320 ml      Physical Exam   Constitutional: She appears well-developed and well-nourished.   Eyes: EOM are normal. Pupils are equal, round, and reactive to light.   Neck: Normal range of motion.   Cardiovascular: Normal rate and regular rhythm.    Pulmonary/Chest:   Course with bilateral wheezing and rhonchi.   Abdominal: Soft. Bowel sounds are normal.   Musculoskeletal:   Left knee with mild swelling but no erythema.   Neurological:   Somnolent, but opens eyes independently to verbal commands    Skin: Skin is warm.   Left heel pressure sore.       Significant Labs:   Blood Culture: No results for input(s): LABBLOO in the last 48 hours.  CBC:   Recent Labs  Lab 03/29/17  0527   WBC 10.60   HGB 9.6*   HCT 31.0*        CMP:   Recent Labs  Lab 03/29/17  0527 03/30/17  0518    145   K 3.6 3.6    105   CO2 27 29   * 152*   BUN 34* 35*   CREATININE 1.9* 1.9*   CALCIUM 8.5* 9.3   ANIONGAP 9 11   EGFRNONAA 24* 24*       Significant Imaging: I have reviewed and interpreted all pertinent imaging results/findings within the past 24 hours.

## 2017-03-30 NOTE — PROGRESS NOTES
"PALLIATIVE CARE PROGRESS NOTE:    Brief bedside visit.  Patient states she feels "okay" today.  Appears more tired.  No specific complaints.      With patient's permission, I met with her daughter Marleni who was present.  She is an RN at [employer] and has excellent understanding of her mother's condition and brought her into her home to provide extra care.  She also has excellent understanding of quality of life issues.  She states her mother has had a steady decline after the death of her daughter (Marleni's sister) about 18 months ago following complications of a lung transplant.  She states the patient then insisted on making all of her own  arrangements, and says "she just gave up."    We discussed goals of care, and she states she would want to continue home health with PT/OT and an aide.  (They have HH with [facility]).  She would like to increase patient's mobility as much as possible.  This is also the patient's stated goal. She would also want patient to have knee injection (by ortho)  during this admission if possible.  Patient also agreeable.  She is also interested in AIM/Palliative NP home visits.  Discussed options of [facility] (ALONA Eldridge) or [facility] (ELEANOR Morgan) - explained I will ask SW to see if benefits are covered under her insurance.      Daughter (and patient) reaffirm DNR.  I reviewed the LaPOST with daughter, completed it and she signed (patient to weak to sign) with her mother's permission.  I provided several copies to the daughter; original and copy were also placed in the blue folder to be scanned into EPIC.      Provided literature, "Hard Choices For [place] People", fact sheets on cpr.  Ample time was allowed for discussion of concerns and feelings.  All questions asked and answered.  Emotional support provided.     Plan/recommendations:     LaPOST document.  (done)   Patient may benefit from HH and consider PT/OT, also AIM program.     Virginia Lucero, MAON, RN, CCRN, CHPN  Palliative " Care Nurse Coordinator   [facility]  [phone]

## 2017-03-30 NOTE — PROGRESS NOTES
[facility]  Progress Note    Patient Name: Maria D Rodrigues  MRN: [id number]  Patient Class: IP- Inpatient   Admission Date: 3/27/2017  Length of Stay: 3 days  Attending Physician: Theresa Llanos MD  Primary Care Provider: Carla Guerrero MD        Subjective:     Principal Problem:Pneumonia of both lungs due to infectious organism    HPI:  86 y/o female with dementia, HTN, HLP, uncontrolled DM2, CKD IV, and h/o CVA who presented with SOB. Daughter at the bedside reported she noticed that the patient had wheezing that started 2 days ago and then last night became more SOB. She had increasing cough, but she was unsure if the patient had any fever or chills. Earlier this month, patient was admitted for observation for influenza A and UTI. Pt patient is able to answer simple questions, but most of the history is obtained through the daughter who is the primary care giver. She also reported chronic left knee pain that limits patient's mobility. Pt does transfers and sits in chair but does not do much otherwise. In the ER, patient was noted to be hypoxic with sats of 84% in RA and workup was remarkable for probable infiltrates consistent with pneumonia.    Hospital Course:  Ms. Rodrigues was admitted with respiratory distress with hypoxia due to pneumonia and possible volume overload state. Pt was empirically treated with Zosyn/Vanc, all culture NGTD. Pt had signs of possible CHF exacerbation and patient had ECHO checked which showed normal EF but with diastolic dysfunction s/p pulse dose IV lasix with improvement of symptoms. Pt also reported chronic left knee pain with effusion which was evaluated by Ortho to be arthritis and not septic. They will consider possible injection of steroids if pain persists. Palliative care consulted, goals of care discussed and the patient was made a DNR.  The patient's daughter would like to pursue home visits by (MD or NP) once the patient is  discharged and she will resume home health.       Interval History: The patient had no problems reported overnight except for an elevated blood pressure.    Review of Systems   Unable to perform ROS: Dementia (Minimally verbal during exam)     Objective:     Vital Signs (Most Recent):  Temp: 98 °F (36.7 °C) (03/30/17 0711)  Pulse: 60 (03/30/17 1352)  Resp: (!) 62 (03/30/17 1352)  BP: (!) 205/91 (03/30/17 1240)  SpO2: 98 % (03/30/17 1352) Vital Signs (24h Range):  Temp:  [97.4 °F (36.3 °C)-98 °F (36.7 °C)] 98 °F (36.7 °C)  Pulse:  [56-73] 60  Resp:  [18-62] 62  SpO2:  [92 %-98 %] 98 %  BP: (160-205)/(70-91) 205/91     Weight: 87.1 kg (192 lb)  Body mass index is 30.99 kg/(m^2).    Intake/Output Summary (Last 24 hours) at 03/30/17 1456  Last data filed at 03/30/17 0531   Gross per 24 hour   Intake              320 ml   Output                0 ml   Net              320 ml      Physical Exam   Constitutional: She appears well-developed and well-nourished.   Eyes: EOM are normal. Pupils are equal, round, and reactive to light.   Neck: Normal range of motion.   Cardiovascular: Normal rate and regular rhythm.    Pulmonary/Chest:   Course with bilateral wheezing and rhonchi.   Abdominal: Soft. Bowel sounds are normal.   Musculoskeletal:   Left knee with mild swelling but no erythema.   Neurological:   Somnolent, but opens eyes independently to verbal commands    Skin: Skin is warm.   Left heel pressure sore.       Significant Labs:   Blood Culture: No results for input(s): LABBLOO in the last 48 hours.  CBC:   Recent Labs  Lab 03/29/17 0527   WBC 10.60   HGB 9.6*   HCT 31.0*        CMP:   Recent Labs  Lab 03/29/17 0527 03/30/17  0518    145   K 3.6 3.6    105   CO2 27 29   * 152*   BUN 34* 35*   CREATININE 1.9* 1.9*   CALCIUM 8.5* 9.3   ANIONGAP 9 11   EGFRNONAA 24* 24*       Significant Imaging: I have reviewed and interpreted all pertinent imaging results/findings within the past 24  hours.    Assessment/Plan:      * Pneumonia of both lungs due to infectious organism  * Acute diastolic CHF exacerbation  Respiratory distress with hypoxia  Assessment and Plan:  Pt with recent admission of influenza with appropriate treatment earlier this month, now with CXR concerning for pneumonia with possible CHF component as well. Pt treated with empiric Zosyn/Vanc and f/u with cultures, NEBS and titrate O2 as tolerated. Pt was on NRB yesterday morning but this improved after pulse IV lasix for 4-5L via NC. Vancomycin discontinued.  Patient is given scheduled Lasix and follow up CXR shows some clearance of bilateral opacities, which suggests some pulmonary edema.      Hypertension  Bradycardia  Assessment and Plan:  Pt noted to be esther into the 40s's at night. Held metoprolol 3/29 and decreased dose to 50 mg BID from 100 MG BID.BP still poorly controlled so will increase Hydralazine to 75mg BID from 50mg.  Discontinued clonidine PRN which likely contributed to somnolence and bradycardia.  Will give oral Hydralazine for prn SBP>180.        Hyperlipidemia  Continue pravastatin.      Hypothyroidism  Continue levothyroxine. Checking TSH increased to 10.965, will increase Levothyroxine to 75mcg daily from 50mcg.      Degenerative joint disease involving multiple joints  Left knee pain  Assessment and Plan:  X-ray of knee with DDD/arthritis only. Appreciate Ortho input, not septic knee. Possible steroid injection if pain persists. Pain control.      DNR (do not resuscitate)  DNR confirmed with patient and family at the bedside. Palliative care following to address future goals of care issues, educate family and get LaPOST done.      CKD Stage IV  Creatinine approximately at baseline, will monitor.      Ischemic cerebrovascular accident (CVA) of frontal lobe  Continue ASA and statin.      Anemia of chronic disease  H/H consistent with previous levels, no reported bleeding, monitor.      Leukocytosis  Resolved.  Secondary to pneumonia.      Moderate protein malnutrition  Continue to supplement with Boost glucose control.      H/O: CVA (cerebrovascular accident)  Continue ASA and statin.      Debility  Pt can do transfers but not really able to walk, PT/OT. Family would like home health when ready for discharge. Not interested in placement.      Encephalopathy, metabolic  Resolved, likely due to clonidine which has been stopped. Pt does have mild dementia, but answers questions mostly appropriately. Head CT negative.      VTE Risk Mitigation         Ordered     enoxaparin injection 30 mg  Daily     Route:  Subcutaneous        03/27/17 0818     Medium Risk of VTE  Once      03/27/17 0818     Place sequential compression device  Until discontinued      03/27/17 0818     Place YON hose  Until discontinued      03/27/17 0818          Theresa Llanos MD  Department of Hospital Medicine   [facility]

## 2017-03-30 NOTE — PHYSICIAN QUERY
"PT Name: Maria D Rodrigues  MR #: [id number]    Physician Query Form - Pneumonia Clarification     CDS/: Antonia Rodas RN, CCDS              Contact information: [phone]  This form is a permanent document in the medical record.    Query Date:  March 30, 2017    By submitting this query, we are merely seeking further clarification of documentation. Please utilize your independent clinical judgment when addressing the question(s) below.    The Medical record contains the following:   Indicators   Supporting Clinical Findings Location in Medical Record   x "Pneumonia" documented Ms. Rodrigues was admitted with respiratory distress with hypoxia due to pneumonia and possible volume overload state.     Pt was empirically treated with Zosyn/Vanc, all culture NGTD.     Pt had signs of possible  CHF exacerbation and patient had ECHO checked which showed normal EF but with diastolic dysfunction s/p pulse dose IV lasix with improvement of symptoms.   Primary PN 3/29    Chest X-Ray:      PaO2    PaCO2     O2 sat      Cultures       Treatment       Supplemental O2      Other         Provider, please specify type of pneumonia.    [  x] Bacterial Pneumonia (Specify organism): ___Suspected Strep pneumoniae___________________    [  ] Pneumonia ruled out    [  ] Other type of pneumonia (please specify): _____________________    [  ] Clinically undetermined    Please document in your progress notes daily for the duration of treatment, until resolved, and include in your discharge summary.    .                                                                                    "

## 2017-03-30 NOTE — SUBJECTIVE & OBJECTIVE
Interval History: Pt was found to be minimally responsive this morning, even to painful stimuli. Pt was reported fine all night except for noted bradycardia into the 40s. Pt was given clonidine for elevated blood pressure. Pt was emergently assessed by early hospitalist and stat head CT was done. While in the CT machine, patient's mental status improved.    Review of Systems   Constitutional: Negative for fever.   HENT: Negative for sore throat.    Respiratory: Positive for cough and shortness of breath.    Cardiovascular: Negative for chest pain.     Objective:     Vital Signs (Most Recent):  Temp: 97.8 °F (36.6 °C) (03/30/17 0001)  Pulse: 66 (03/30/17 0001)  Resp: 19 (03/30/17 0001)  BP: (!) 169/76 (BP coming down. tabby BP meds given) (03/30/17 0001)  SpO2: 96 % (03/30/17 0001) Vital Signs (24h Range):  Temp:  [97.4 °F (36.3 °C)-97.8 °F (36.6 °C)] 97.8 °F (36.6 °C)  Pulse:  [51-66] 66  Resp:  [18-24] 19  SpO2:  [91 %-96 %] 96 %  BP: (117-187)/(59-86) 169/76     Weight: 87.1 kg (192 lb)  Body mass index is 30.99 kg/(m^2).    Intake/Output Summary (Last 24 hours) at 03/30/17 0157  Last data filed at 03/29/17 1842   Gross per 24 hour   Intake              560 ml   Output                0 ml   Net              560 ml      Physical Exam   Constitutional: She appears well-developed and well-nourished.   Eyes: EOM are normal. Pupils are equal, round, and reactive to light.   Neck: Normal range of motion.   Cardiovascular: Normal rate and regular rhythm.    Pulmonary/Chest:   Course with bilateral wheezing and rhonchi.   Abdominal: Soft. Bowel sounds are normal.   Musculoskeletal:   Left knee with mild swelling but no erythema.   Neurological:   Awake and alert, oriented x 2.   Skin: Skin is warm.   Left heel pressure sore.       Significant Labs: All pertinent labs within the past 24 hours have been reviewed.    Significant Imaging: I have reviewed and interpreted all pertinent imaging results/findings within the past 24  hours.

## 2017-03-30 NOTE — NURSING
Telemetry 8554 is same on box and monitor. Waveform noted. Checked with off going nurse Ms Arevalo RN. DENIES PAIN. Student nurses introduced

## 2017-03-30 NOTE — ASSESSMENT & PLAN NOTE
Bradycardia  Assessment and Plan:  Pt noted to be esther into the 40s's at night. Will hold metoprolol today and decrease dose to 50 mg BID from 100 MG BID starting tomorrow; and will titrate up hydralazine PO to 50 mg BID from 25 mg BID for better BP control. Will d/c clonidine PRN which likely contributed to somnolence and bradycardia. Will have IV hyrdralazine PRN. Will check TSH and FT4.

## 2017-03-30 NOTE — PROGRESS NOTES
[facility] Medicine  Progress Note    Patient Name: Maria D Rodrigues  MRN: [id number]  Patient Class: IP- Inpatient   Admission Date: 3/27/2017  Attending Physician: Sisi Salas MD  Primary Care Provider: Carla Guerrero MD        Subjective:     Principal Problem:Pneumonia of both lungs due to infectious organism    HPI:  86 y/o female with dementia, HTN, HLP, uncontrolled DM2, CKD IV, and h/o CVA who presented with SOB. Daughter at the bedside reported she noticed that the patient had wheezing that started 2 days ago and then last night became more SOB. She had increasing cough, but she was unsure if the patient had any fever or chills. Earlier this month, patient was admitted for observation for influenza A and UTI. Pt patient is able to answer simple questions, but most of the history is obtained through the daughter who is the primary care giver. She also reported chronic left knee pain that limits patient's mobility. Pt does transfers and sits in chair but does not do much otherwise. In the ER, patient was noted to be hypoxic with sats of 84% in RA and workup was remarkable for probable infiltrates consistent with pneumonia.    Hospital Course:  Ms. Rodrigues was admitted with respiratory distress with hypoxia due to pneumonia and possible volume overload state. Pt was empirically treated with Zosyn/Vanc, all culture NGTD. Pt had signs of possible CHF exacerbation and patient had ECHO checked which showed normal EF but with diastolic dysfunction s/p pulse dose IV lasix with improvement of symptoms. Pt also reported chronic left knee pain with effusion which was evaluated by Ortho to be arthritis and not septic. They will consider possible injection of steroids if pain persists. Palliative care consulted to discuss goals of care. Pt is DNR.    Interval History: Pt was found to be minimally responsive this morning, even to painful stimuli. Pt was reported fine all night except for  noted bradycardia into the 40s. Pt was given clonidine for elevated blood pressure. Pt was emergently assessed by early hospitalist and stat head CT was done. While in the CT machine, patient's mental status improved.    Review of Systems   Constitutional: Negative for fever.   HENT: Negative for sore throat.    Respiratory: Positive for cough and shortness of breath.    Cardiovascular: Negative for chest pain.     Objective:     Vital Signs (Most Recent):  Temp: 97.8 °F (36.6 °C) (03/30/17 0001)  Pulse: 66 (03/30/17 0001)  Resp: 19 (03/30/17 0001)  BP: (!) 169/76 (BP coming down. tabby BP meds given) (03/30/17 0001)  SpO2: 96 % (03/30/17 0001) Vital Signs (24h Range):  Temp:  [97.4 °F (36.3 °C)-97.8 °F (36.6 °C)] 97.8 °F (36.6 °C)  Pulse:  [51-66] 66  Resp:  [18-24] 19  SpO2:  [91 %-96 %] 96 %  BP: (117-187)/(59-86) 169/76     Weight: 87.1 kg (192 lb)  Body mass index is 30.99 kg/(m^2).    Intake/Output Summary (Last 24 hours) at 03/30/17 0157  Last data filed at 03/29/17 1842   Gross per 24 hour   Intake              560 ml   Output                0 ml   Net              560 ml      Physical Exam   Constitutional: She appears well-developed and well-nourished.   Eyes: EOM are normal. Pupils are equal, round, and reactive to light.   Neck: Normal range of motion.   Cardiovascular: Normal rate and regular rhythm.    Pulmonary/Chest:   Course with bilateral wheezing and rhonchi.   Abdominal: Soft. Bowel sounds are normal.   Musculoskeletal:   Left knee with mild swelling but no erythema.   Neurological:   Awake and alert, oriented x 2.   Skin: Skin is warm.   Left heel pressure sore.       Significant Labs: All pertinent labs within the past 24 hours have been reviewed.    Significant Imaging: I have reviewed and interpreted all pertinent imaging results/findings within the past 24 hours.    Assessment/Plan:      * Pneumonia of both lungs due to infectious organism  * Acute diastolic CHF exacerbation  Respiratory  distress with hypoxia  Assessment and Plan:  Pt with recent admission of influenza with appropriate treatment earlier this month, now with CXR concerning for pneumonia with possible CHF component as well. Pt treated with empiric Zosyn/Vanc and f/u with cultures, NEBS and titrate O2 as tolerated. Pt was on NRB yesterday morning but this improved after pulse IV lasix for 4-5L via NC. Will de-escalate Vanc today and add scheduled lasix given appearance of repeat CXR. ECHO with normal EF but with diastolic dysfunction.    CKD Stage IV  Creatinine approximately at baseline, will monitor.    Hypertension  Bradycardia  Assessment and Plan:  Pt noted to be esther into the 40s's at night. Will hold metoprolol today and decrease dose to 50 mg BID from 100 MG BID starting tomorrow; and will titrate up hydralazine PO to 50 mg BID from 25 mg BID for better BP control. Will d/c clonidine PRN which likely contributed to somnolence and bradycardia. Will have IV hyrdralazine PRN. Will check TSH and FT4.    Hyperlipidemia  Continue pravastatin.    Hypothyroidism  Continue levothyroxine. Checking TSH/FT4.    Degenerative joint disease involving multiple joints  Left knee pain  Assessment and Plan:  X-ray of knee with DDD/arthritis only. Appreciate Ortho input, not septic knee. Possible steroid injection if pain persists. Pain control.    DNR (do not resuscitate)  DNR confirmed with patient and family at the bedside. Palliative care following to address future goals of care issues, educate family and get LaPOST done.    Anemia of chronic disease  H/H consistent with previous levels, no reported bleeding, monitor.    Leukocytosis  Resolved. Secondary to pneumonia.    Moderate protein malnutrition  Will supplement with Boost glucose control.    H/O: CVA (cerebrovascular accident)  Continue ASA and statin.    Debility  Pt can do transfers but not really able to walk, PT/OT. Family would like home health when ready for discharge. Not  interested in placement.    Encephalopathy, metabolic  Resolved, likely due to clonidine which has been stopped. Pt does have mild dementia, but answers questions mostly appropriately. Head CT negative.      VTE Risk Mitigation         Ordered     enoxaparin injection 30 mg  Daily     Route:  Subcutaneous        03/27/17 0818     Medium Risk of VTE  Once      03/27/17 0818     Place sequential compression device  Until discontinued      03/27/17 0818     Place YON hose  Until discontinued      03/27/17 0818          Sisi Salas MD  Department of Hospital Medicine   [facility]

## 2017-03-30 NOTE — ASSESSMENT & PLAN NOTE
Pt with recent admission of influenza with appropriate treatment earlier this month, now with CXR concerning for pneumonia with possible CHF component as well. Pt treated with empiric Zosyn/Vanc and f/u with cultures, NEBS and titrate O2 as tolerated. Pt was on NRB yesterday morning but this improved after pulse IV lasix for 4-5L via NC. Will de-escalate Vanc today and add scheduled lasix given appearance of repeat CXR.

## 2017-03-30 NOTE — ASSESSMENT & PLAN NOTE
DNR confirmed with patient and family at the bedside. Palliative care following to address future goals of care issues, educate family and get LaPOST done.

## 2017-03-30 NOTE — ASSESSMENT & PLAN NOTE
Continue levothyroxine. Checking TSH increased to 10.965, will increase Levothyroxine to 75mcg daily from 50mcg.

## 2017-03-30 NOTE — ASSESSMENT & PLAN NOTE
Bradycardia  Assessment and Plan:  Pt noted to be esther into the 40s's at night. Held metoprolol 3/29 and decreased dose to 50 mg BID from 100 MG BID.BP still poorly controlled so will increase Hydralazine to 75mg BID from 50mg.  Discontinued clonidine PRN which likely contributed to somnolence and bradycardia.  Will give oral Hydralazine for prn SBP>180.

## 2017-03-30 NOTE — ASSESSMENT & PLAN NOTE
Resolved, likely due to clonidine which has been stopped. Pt does have mild dementia, but answers questions mostly appropriately. Head CT negative.

## 2017-03-30 NOTE — ASSESSMENT & PLAN NOTE
Left knee pain  Assessment and Plan:  X-ray of knee with DDD/arthritis only. Appreciate Ortho input, not septic knee. Possible steroid injection if pain persists. Pain control.

## 2017-03-30 NOTE — ASSESSMENT & PLAN NOTE
* Acute diastolic CHF exacerbation  Respiratory distress with hypoxia  Assessment and Plan:  Pt with recent admission of influenza with appropriate treatment earlier this month, now with CXR concerning for pneumonia with possible CHF component as well. Pt treated with empiric Zosyn/Vanc and f/u with cultures, NEBS and titrate O2 as tolerated. Pt was on NRB yesterday morning but this improved after pulse IV lasix for 4-5L via NC. Vancomycin discontinued.  Patient is given scheduled Lasix and follow up CXR shows some clearance of bilateral opacities, which suggests some pulmonary edema.

## 2017-03-31 PROBLEM — E87.6 HYPOKALEMIA: Status: ACTIVE | Noted: 2017-03-31

## 2017-03-31 LAB
ANION GAP SERPL CALC-SCNC: 10 MMOL/L
BUN SERPL-MCNC: 33 MG/DL
CALCIUM SERPL-MCNC: 9.4 MG/DL
CHLORIDE SERPL-SCNC: 105 MMOL/L
CO2 SERPL-SCNC: 29 MMOL/L
CREAT SERPL-MCNC: 2 MG/DL
EST. GFR  (AFRICAN AMERICAN): 26 ML/MIN/1.73 M^2
EST. GFR  (NON AFRICAN AMERICAN): 22 ML/MIN/1.73 M^2
GLUCOSE SERPL-MCNC: 213 MG/DL
POCT GLUCOSE: 177 MG/DL (ref 70–110)
POCT GLUCOSE: 218 MG/DL (ref 70–110)
POCT GLUCOSE: 225 MG/DL (ref 70–110)
POCT GLUCOSE: 57 MG/DL (ref 70–110)
POTASSIUM SERPL-SCNC: 3.2 MMOL/L
SODIUM SERPL-SCNC: 144 MMOL/L

## 2017-03-31 PROCEDURE — 63600175 PHARM REV CODE 636 W HCPCS: Performed by: HOSPITALIST

## 2017-03-31 PROCEDURE — 25000003 PHARM REV CODE 250: Performed by: HOSPITALIST

## 2017-03-31 PROCEDURE — 94640 AIRWAY INHALATION TREATMENT: CPT

## 2017-03-31 PROCEDURE — 12000002 HC ACUTE/MED SURGE SEMI-PRIVATE ROOM

## 2017-03-31 PROCEDURE — 36415 COLL VENOUS BLD VENIPUNCTURE: CPT

## 2017-03-31 PROCEDURE — 25000242 PHARM REV CODE 250 ALT 637 W/ HCPCS: Performed by: HOSPITALIST

## 2017-03-31 PROCEDURE — 63600175 PHARM REV CODE 636 W HCPCS: Performed by: EMERGENCY MEDICINE

## 2017-03-31 PROCEDURE — 25000003 PHARM REV CODE 250: Performed by: EMERGENCY MEDICINE

## 2017-03-31 PROCEDURE — 25000003 PHARM REV CODE 250: Performed by: INTERNAL MEDICINE

## 2017-03-31 PROCEDURE — 80048 BASIC METABOLIC PNL TOTAL CA: CPT

## 2017-03-31 RX ORDER — FUROSEMIDE 40 MG/1
40 TABLET ORAL DAILY
Status: DISCONTINUED | OUTPATIENT
Start: 2017-04-01 | End: 2017-04-01 | Stop reason: HOSPADM

## 2017-03-31 RX ORDER — METOPROLOL TARTRATE 50 MG/1
100 TABLET ORAL 2 TIMES DAILY
Status: DISCONTINUED | OUTPATIENT
Start: 2017-03-31 | End: 2017-04-01 | Stop reason: HOSPADM

## 2017-03-31 RX ORDER — POTASSIUM CHLORIDE 20 MEQ/1
20 TABLET, EXTENDED RELEASE ORAL 2 TIMES DAILY
Status: COMPLETED | OUTPATIENT
Start: 2017-03-31 | End: 2017-03-31

## 2017-03-31 RX ADMIN — DEXTROSE MONOHYDRATE 12.5 G: 25 INJECTION, SOLUTION INTRAVENOUS at 05:03

## 2017-03-31 RX ADMIN — PAROXETINE HYDROCHLORIDE HEMIHYDRATE 20 MG: 20 TABLET, FILM COATED ORAL at 06:03

## 2017-03-31 RX ADMIN — PIPERACILLIN SODIUM,TAZOBACTAM SODIUM 4.5 G: 4; .5 INJECTION, POWDER, FOR SOLUTION INTRAVENOUS at 06:03

## 2017-03-31 RX ADMIN — VALSARTAN 160 MG: 80 TABLET, FILM COATED ORAL at 08:03

## 2017-03-31 RX ADMIN — POTASSIUM CHLORIDE 20 MEQ: 1500 TABLET, EXTENDED RELEASE ORAL at 08:03

## 2017-03-31 RX ADMIN — INSULIN ASPART 4 UNITS: 100 INJECTION, SOLUTION INTRAVENOUS; SUBCUTANEOUS at 01:03

## 2017-03-31 RX ADMIN — PRAVASTATIN SODIUM 20 MG: 10 TABLET ORAL at 08:03

## 2017-03-31 RX ADMIN — ALBUTEROL SULFATE 2.5 MG: 2.5 SOLUTION RESPIRATORY (INHALATION) at 07:03

## 2017-03-31 RX ADMIN — INSULIN ASPART 15 UNITS: 100 INJECTION, SOLUTION INTRAVENOUS; SUBCUTANEOUS at 08:03

## 2017-03-31 RX ADMIN — INSULIN DETEMIR 15 UNITS: 100 INJECTION, SOLUTION SUBCUTANEOUS at 08:03

## 2017-03-31 RX ADMIN — HYDRALAZINE HYDROCHLORIDE 75 MG: 25 TABLET ORAL at 08:03

## 2017-03-31 RX ADMIN — POTASSIUM CHLORIDE 20 MEQ: 1500 TABLET, EXTENDED RELEASE ORAL at 01:03

## 2017-03-31 RX ADMIN — ASPIRIN 81 MG: 81 TABLET, COATED ORAL at 08:03

## 2017-03-31 RX ADMIN — HYDRALAZINE HYDROCHLORIDE 10 MG: 20 INJECTION INTRAMUSCULAR; INTRAVENOUS at 05:03

## 2017-03-31 RX ADMIN — HYDRALAZINE HYDROCHLORIDE 10 MG: 20 INJECTION INTRAMUSCULAR; INTRAVENOUS at 06:03

## 2017-03-31 RX ADMIN — LEVOTHYROXINE SODIUM 75 MCG: 75 TABLET ORAL at 05:03

## 2017-03-31 RX ADMIN — FUROSEMIDE 40 MG: 10 INJECTION, SOLUTION INTRAMUSCULAR; INTRAVENOUS at 08:03

## 2017-03-31 RX ADMIN — PIPERACILLIN SODIUM,TAZOBACTAM SODIUM 4.5 G: 4; .5 INJECTION, POWDER, FOR SOLUTION INTRAVENOUS at 05:03

## 2017-03-31 RX ADMIN — DOCUSATE SODIUM 100 MG: 100 CAPSULE, LIQUID FILLED ORAL at 08:03

## 2017-03-31 RX ADMIN — METOPROLOL TARTRATE 50 MG: 50 TABLET ORAL at 08:03

## 2017-03-31 RX ADMIN — PANTOPRAZOLE SODIUM 40 MG: 40 TABLET, DELAYED RELEASE ORAL at 08:03

## 2017-03-31 RX ADMIN — ALBUTEROL SULFATE 2.5 MG: 2.5 SOLUTION RESPIRATORY (INHALATION) at 08:03

## 2017-03-31 RX ADMIN — ALBUTEROL SULFATE 2.5 MG: 2.5 SOLUTION RESPIRATORY (INHALATION) at 01:03

## 2017-03-31 RX ADMIN — ENOXAPARIN SODIUM 30 MG: 100 INJECTION SUBCUTANEOUS at 05:03

## 2017-03-31 RX ADMIN — INSULIN ASPART 15 UNITS: 100 INJECTION, SOLUTION INTRAVENOUS; SUBCUTANEOUS at 01:03

## 2017-03-31 NOTE — PROGRESS NOTES
[facility] Medicine  Progress Note    Patient Name: Maria D Rodrigues  MRN: [id number]  Patient Class: IP- Inpatient   Admission Date: 3/27/2017  Length of Stay: 4 days  Attending Physician: Theresa Llanos MD  Primary Care Provider: Carla Guerrero MD        Subjective:     Principal Problem:Pneumonia of both lungs due to infectious organism    HPI:  86 y/o female with dementia, HTN, HLP, uncontrolled DM2, CKD IV, and h/o CVA who presented with SOB. Daughter at the bedside reported she noticed that the patient had wheezing that started 2 days ago and then last night became more SOB. She had increasing cough, but she was unsure if the patient had any fever or chills. Earlier this month, patient was admitted for observation for influenza A and UTI. Pt patient is able to answer simple questions, but most of the history is obtained through the daughter who is the primary care giver. She also reported chronic left knee pain that limits patient's mobility. Pt does transfers and sits in chair but does not do much otherwise. In the ER, patient was noted to be hypoxic with sats of 84% in RA and workup was remarkable for probable infiltrates consistent with pneumonia.    Hospital Course:  Ms. Rodrigues was admitted with respiratory distress with hypoxia due to pneumonia and possible volume overload state. Pt was empirically treated with Zosyn/Vanc, all culture NGTD. Pt had signs of possible CHF exacerbation and patient had ECHO checked which showed normal EF but with diastolic dysfunction s/p pulse dose IV lasix with improvement of symptoms. Pt also reported chronic left knee pain with effusion which was evaluated by Ortho to be arthritis and not septic. They will consider possible injection of steroids if pain persists. Palliative care consulted, goals of care discussed and the patient was made a DNR.  The patient's daughter would like to pursue home visits by (MD or NP) once the patient is  discharged and she will resume home health.  The patient continues to have poorly controlled hypertension and her B-blocker is titrated as her heart rate has stabilized.     Interval History: The patient has no problems reported overnight.    Review of Systems   Unable to perform ROS: Dementia (Minimally verbal during exam)     Objective:     Vital Signs (Most Recent):  Temp: 97.8 °F (36.6 °C) (03/31/17 0832)  Pulse: 74 (03/31/17 0748)  Resp: 18 (03/31/17 0748)  BP: (!) 186/95 (03/31/17 0832)  SpO2: 96 % (03/31/17 0748) Vital Signs (24h Range):  Temp:  [97.8 °F (36.6 °C)-98.2 °F (36.8 °C)] 97.8 °F (36.6 °C)  Pulse:  [60-79] 74  Resp:  [18-62] 18  SpO2:  [96 %-98 %] 96 %  BP: (148-202)/(64-95) 186/95     Weight: 87.1 kg (192 lb)  Body mass index is 30.99 kg/(m^2).    Intake/Output Summary (Last 24 hours) at 03/31/17 1245  Last data filed at 03/31/17 0632   Gross per 24 hour   Intake              100 ml   Output                0 ml   Net              100 ml      Physical Exam   Constitutional: She appears well-developed and well-nourished.   Eyes: EOM are normal. Pupils are equal, round, and reactive to light.   Neck: Normal range of motion.   Cardiovascular: Normal rate and regular rhythm.    Pulmonary/Chest: Effort normal and breath sounds normal. No respiratory distress. She has no wheezes.   Abdominal: Soft. Bowel sounds are normal.   Musculoskeletal:   Left knee with mild swelling but no erythema.   Neurological:   Somnolent, but opens eyes independently to verbal commands    Skin: Skin is warm.   Left heel pressure sore.       Significant Labs:   CBC: No results for input(s): WBC, HGB, HCT, PLT in the last 48 hours.  CMP:   Recent Labs  Lab 03/30/17  0518 03/31/17  0514    144   K 3.6 3.2*    105   CO2 29 29   * 213*   BUN 35* 33*   CREATININE 1.9* 2.0*   CALCIUM 9.3 9.4   ANIONGAP 11 10   EGFRNONAA 24* 22*       Significant Imaging: I have reviewed and interpreted all pertinent imaging  results/findings within the past 24 hours.    Assessment/Plan:      * Pneumonia of both lungs due to infectious organism  * Acute diastolic CHF exacerbation  Respiratory distress with hypoxia  Assessment and Plan:  Pt with recent admission of influenza with appropriate treatment earlier this month, now with CXR concerning for pneumonia with possible CHF component as well. Pt treated with empiric Zosyn/Vanc and f/u with cultures, NEBS and titrate O2 as tolerated. Pt was on NRB initially but is currently on 3L O2 via NC.   Patient is given scheduled Lasix and follow up CXR shows some clearance of bilateral opacities, which suggests some pulmonary edema perhaps superimposed on a pneumonia.  Acute respiratory distress has resolved and the patient continues treatment for suspected Strep pneumoniae pneumonia.      Hypertension  Bradycardia  Assessment and Plan:  Pt noted to be esther into the 40s's previously, but current heart rate in the 70-80 range. Held metoprolol 3/29, but now will resume prior doses for better control of BP.  Continue current scheduled dosing of Hydralazine.  Discontinued clonidine PRN which likely contributed to somnolence and bradycardia.  Will give oral Hydralazine for prn SBP>180.        Hyperlipidemia  Continue pravastatin.      Hypothyroidism  Continue levothyroxine. TSH increased to 10.965, will continue Levothyroxine to 75mcg daily and will need to re-check in about 6 weeks.       Degenerative joint disease involving multiple joints  Left knee pain  Assessment and Plan:  X-ray of knee with DDD/arthritis only. Appreciate Ortho input, not septic knee. Possible steroid injection if pain persists. Pain control.      DNR (do not resuscitate)  DNR confirmed with patient and family at the bedside. Palliative care following to address future goals of care issues, educate family and get LaPOST done.      CKD Stage IV  Creatinine approximately at baseline, there is a slight increase with diuresis, so  will continue to monitor electrolytes.      Ischemic cerebrovascular accident (CVA) of frontal lobe  Continue ASA and statin.      Anemia of chronic disease  H/H consistent with previous levels, no reported bleeding, monitor.      Leukocytosis  Resolved. Secondary to suspected strep pneumoniae pneumonia.      Moderate protein malnutrition  Continue to supplement with Boost glucose control.      H/O: CVA (cerebrovascular accident)  Continue ASA and statin.      Debility  Pt can do transfers but not really able to walk, PT/OT. Family would like home health when ready for discharge. Not interested in placement.      Encephalopathy, metabolic  Resolved, likely due to clonidine which has been stopped. Pt does have mild dementia, but answers questions mostly appropriately. Head CT negative.      Hypokalemia  Give 2 doses of potassium and monitor closely.      VTE Risk Mitigation         Ordered     enoxaparin injection 30 mg  Daily     Route:  Subcutaneous        03/27/17 0818     Medium Risk of VTE  Once      03/27/17 0818     Place sequential compression device  Until discontinued      03/27/17 0818     Place YON hose  Until discontinued      03/27/17 0818          Theresa Llanos MD  Department of Hospital Medicine   [facility]

## 2017-03-31 NOTE — SUBJECTIVE & OBJECTIVE
Interval History: The patient has no problems reported overnight.    Review of Systems   Unable to perform ROS: Dementia (Minimally verbal during exam)     Objective:     Vital Signs (Most Recent):  Temp: 97.8 °F (36.6 °C) (03/31/17 0832)  Pulse: 74 (03/31/17 0748)  Resp: 18 (03/31/17 0748)  BP: (!) 186/95 (03/31/17 0832)  SpO2: 96 % (03/31/17 0748) Vital Signs (24h Range):  Temp:  [97.8 °F (36.6 °C)-98.2 °F (36.8 °C)] 97.8 °F (36.6 °C)  Pulse:  [60-79] 74  Resp:  [18-62] 18  SpO2:  [96 %-98 %] 96 %  BP: (148-202)/(64-95) 186/95     Weight: 87.1 kg (192 lb)  Body mass index is 30.99 kg/(m^2).    Intake/Output Summary (Last 24 hours) at 03/31/17 1245  Last data filed at 03/31/17 0632   Gross per 24 hour   Intake              100 ml   Output                0 ml   Net              100 ml      Physical Exam   Constitutional: She appears well-developed and well-nourished.   Eyes: EOM are normal. Pupils are equal, round, and reactive to light.   Neck: Normal range of motion.   Cardiovascular: Normal rate and regular rhythm.    Pulmonary/Chest: Effort normal and breath sounds normal. No respiratory distress. She has no wheezes.   Abdominal: Soft. Bowel sounds are normal.   Musculoskeletal:   Left knee with mild swelling but no erythema.   Neurological:   Somnolent, but opens eyes independently to verbal commands    Skin: Skin is warm.   Left heel pressure sore.       Significant Labs:   CBC: No results for input(s): WBC, HGB, HCT, PLT in the last 48 hours.  CMP:   Recent Labs  Lab 03/30/17  0518 03/31/17  0514    144   K 3.6 3.2*    105   CO2 29 29   * 213*   BUN 35* 33*   CREATININE 1.9* 2.0*   CALCIUM 9.3 9.4   ANIONGAP 11 10   EGFRNONAA 24* 22*       Significant Imaging: I have reviewed and interpreted all pertinent imaging results/findings within the past 24 hours.

## 2017-03-31 NOTE — PROGRESS NOTES
PALLIATIVE CARE PROGRESS NOTE:    Record/notes reviewed.  Discussed with Dr. Llanos.  Please call if Palliative Care can be of further assistance.    Virginia Lucero, BSN, RN, CCRN   Palliative Care Nurse Coordinator   [facility]  [phone]

## 2017-03-31 NOTE — PLAN OF CARE
Problem: Patient Care Overview  Goal: Plan of Care Review  Outcome: Ongoing (interventions implemented as appropriate)    03/31/17 4231   Coping/Psychosocial   Plan Of Care Reviewed With patient   Quiet and uneventful hours. Cooperative with caregivers. Sat in chair at bedside with problems at least 4 hours. Frequent nonproductive cough noted. Totally dependent on family and staff to meet ADLs No change in physical condition. Turned every 2 hours while in bed.

## 2017-03-31 NOTE — NURSING
Hypoglycemia 57 . Dextrose 12.5 gm given ate only 25% diet. Denies pain. Awake  alert  And oreinted x 3

## 2017-03-31 NOTE — ASSESSMENT & PLAN NOTE
Continue levothyroxine. TSH increased to 10.965, will continue Levothyroxine to 75mcg daily and will need to re-check in about 6 weeks.

## 2017-03-31 NOTE — PLAN OF CARE
Problem: Patient Care Overview  Goal: Plan of Care Review  Outcome: Ongoing (interventions implemented as appropriate)  Pt free from falls and injury. Pt lungs sound clearer. Pt AO for most of shift, able to report needs and changes, understands and accepts care.

## 2017-03-31 NOTE — ASSESSMENT & PLAN NOTE
Bradycardia  Assessment and Plan:  Pt noted to be esther into the 40s's previously, but current heart rate in the 70-80 range. Held metoprolol 3/29, but now will resume prior doses for better control of BP.  Continue current scheduled dosing of Hydralazine.  Discontinued clonidine PRN which likely contributed to somnolence and bradycardia.  Will give oral Hydralazine for prn SBP>180.

## 2017-03-31 NOTE — ASSESSMENT & PLAN NOTE
* Acute diastolic CHF exacerbation  Respiratory distress with hypoxia  Assessment and Plan:  Pt with recent admission of influenza with appropriate treatment earlier this month, now with CXR concerning for pneumonia with possible CHF component as well. Pt treated with empiric Zosyn/Vanc and f/u with cultures, NEBS and titrate O2 as tolerated. Pt was on NRB initially but is currently on 3L O2 via NC.   Patient is given scheduled Lasix and follow up CXR shows some clearance of bilateral opacities, which suggests some pulmonary edema perhaps superimposed on a pneumonia.  Acute respiratory distress has resolved and the patient continues treatment for suspected Strep pneumoniae pneumonia.

## 2017-03-31 NOTE — ASSESSMENT & PLAN NOTE
Creatinine approximately at baseline, there is a slight increase with diuresis, so will continue to monitor electrolytes.

## 2017-04-01 VITALS
DIASTOLIC BLOOD PRESSURE: 63 MMHG | HEIGHT: 66 IN | SYSTOLIC BLOOD PRESSURE: 137 MMHG | RESPIRATION RATE: 24 BRPM | OXYGEN SATURATION: 100 % | BODY MASS INDEX: 30.86 KG/M2 | TEMPERATURE: 98 F | HEART RATE: 54 BPM | WEIGHT: 192 LBS

## 2017-04-01 PROBLEM — R00.1 BRADYCARDIA: Status: ACTIVE | Noted: 2017-04-01

## 2017-04-01 LAB
ANION GAP SERPL CALC-SCNC: 9 MMOL/L
BACTERIA BLD CULT: NORMAL
BACTERIA BLD CULT: NORMAL
BUN SERPL-MCNC: 33 MG/DL
CALCIUM SERPL-MCNC: 9.2 MG/DL
CHLORIDE SERPL-SCNC: 107 MMOL/L
CO2 SERPL-SCNC: 28 MMOL/L
CREAT SERPL-MCNC: 2.1 MG/DL
EST. GFR  (AFRICAN AMERICAN): 24 ML/MIN/1.73 M^2
EST. GFR  (NON AFRICAN AMERICAN): 21 ML/MIN/1.73 M^2
GLUCOSE SERPL-MCNC: 168 MG/DL
POCT GLUCOSE: 185 MG/DL (ref 70–110)
POCT GLUCOSE: 195 MG/DL (ref 70–110)
POTASSIUM SERPL-SCNC: 3.3 MMOL/L
SODIUM SERPL-SCNC: 144 MMOL/L

## 2017-04-01 PROCEDURE — 25000003 PHARM REV CODE 250: Performed by: INTERNAL MEDICINE

## 2017-04-01 PROCEDURE — 25000003 PHARM REV CODE 250: Performed by: EMERGENCY MEDICINE

## 2017-04-01 PROCEDURE — 25000242 PHARM REV CODE 250 ALT 637 W/ HCPCS: Performed by: HOSPITALIST

## 2017-04-01 PROCEDURE — 80048 BASIC METABOLIC PNL TOTAL CA: CPT

## 2017-04-01 PROCEDURE — 94640 AIRWAY INHALATION TREATMENT: CPT

## 2017-04-01 PROCEDURE — 36415 COLL VENOUS BLD VENIPUNCTURE: CPT

## 2017-04-01 PROCEDURE — 63600175 PHARM REV CODE 636 W HCPCS: Performed by: EMERGENCY MEDICINE

## 2017-04-01 PROCEDURE — 25000003 PHARM REV CODE 250: Performed by: HOSPITALIST

## 2017-04-01 PROCEDURE — 27000221 HC OXYGEN, UP TO 24 HOURS

## 2017-04-01 RX ORDER — AMOXICILLIN AND CLAVULANATE POTASSIUM 875; 125 MG/1; MG/1
1 TABLET, FILM COATED ORAL EVERY 12 HOURS
Qty: 14 TABLET | Refills: 0 | Status: SHIPPED | OUTPATIENT
Start: 2017-04-01

## 2017-04-01 RX ORDER — HYDRALAZINE HYDROCHLORIDE 25 MG/1
50 TABLET, FILM COATED ORAL EVERY 12 HOURS
Qty: 60 TABLET | Refills: 1 | Status: SHIPPED | OUTPATIENT
Start: 2017-04-01 | End: 2018-04-01

## 2017-04-01 RX ORDER — AMOXICILLIN AND CLAVULANATE POTASSIUM 875; 125 MG/1; MG/1
1 TABLET, FILM COATED ORAL EVERY 12 HOURS
Status: DISCONTINUED | OUTPATIENT
Start: 2017-04-01 | End: 2017-04-01 | Stop reason: HOSPADM

## 2017-04-01 RX ORDER — POTASSIUM CHLORIDE 20 MEQ/1
20 TABLET, EXTENDED RELEASE ORAL ONCE
Status: DISCONTINUED | OUTPATIENT
Start: 2017-04-01 | End: 2017-04-01 | Stop reason: HOSPADM

## 2017-04-01 RX ADMIN — PANTOPRAZOLE SODIUM 40 MG: 40 TABLET, DELAYED RELEASE ORAL at 08:04

## 2017-04-01 RX ADMIN — INSULIN ASPART 2 UNITS: 100 INJECTION, SOLUTION INTRAVENOUS; SUBCUTANEOUS at 08:04

## 2017-04-01 RX ADMIN — DOCUSATE SODIUM 100 MG: 100 CAPSULE, LIQUID FILLED ORAL at 08:04

## 2017-04-01 RX ADMIN — PRAVASTATIN SODIUM 20 MG: 10 TABLET ORAL at 08:04

## 2017-04-01 RX ADMIN — VALSARTAN 160 MG: 80 TABLET, FILM COATED ORAL at 08:04

## 2017-04-01 RX ADMIN — FUROSEMIDE 40 MG: 40 TABLET ORAL at 08:04

## 2017-04-01 RX ADMIN — PIPERACILLIN SODIUM,TAZOBACTAM SODIUM 4.5 G: 4; .5 INJECTION, POWDER, FOR SOLUTION INTRAVENOUS at 05:04

## 2017-04-01 RX ADMIN — ALBUTEROL SULFATE 2.5 MG: 2.5 SOLUTION RESPIRATORY (INHALATION) at 07:04

## 2017-04-01 RX ADMIN — ASPIRIN 81 MG: 81 TABLET, COATED ORAL at 08:04

## 2017-04-01 RX ADMIN — HYDRALAZINE HYDROCHLORIDE 75 MG: 25 TABLET ORAL at 08:04

## 2017-04-01 RX ADMIN — METOPROLOL TARTRATE 100 MG: 50 TABLET ORAL at 08:04

## 2017-04-01 RX ADMIN — LEVOTHYROXINE SODIUM 75 MCG: 75 TABLET ORAL at 05:04

## 2017-04-01 RX ADMIN — INSULIN ASPART 15 UNITS: 100 INJECTION, SOLUTION INTRAVENOUS; SUBCUTANEOUS at 08:04

## 2017-04-01 RX ADMIN — PAROXETINE HYDROCHLORIDE HEMIHYDRATE 20 MG: 20 TABLET, FILM COATED ORAL at 08:04

## 2017-04-01 NOTE — DISCHARGE SUMMARY
[facility] Hospital Medicine  Discharge Summary      Patient Name: Maria D Rodrigues  MRN: [id number]  Admission Date: 3/27/2017  Hospital Length of Stay: 5 days  Discharge Date and Time: 4/1/2017 12:25 PM  Attending Physician: Sisi Salas MD  Discharging Provider: Sisi Salas MD  Primary Care Provider: Carla Guerrero MD      HPI:   86 y/o female with dementia, HTN, HLP, uncontrolled DM2, CKD IV, and h/o CVA who presented with SOB. Daughter at the bedside reported she noticed that the patient had wheezing that started 2 days ago and then last night became more SOB. She had increasing cough, but she was unsure if the patient had any fever or chills. Earlier this month, patient was admitted for observation for influenza A and UTI. Pt patient is able to answer simple questions, but most of the history is obtained through the daughter who is the primary care giver. She also reported chronic left knee pain that limits patient's mobility. Pt does transfers and sits in chair but does not do much otherwise. In the ER, patient was noted to be hypoxic with sats of 84% in RA and workup was remarkable for probable infiltrates consistent with pneumonia.    * No surgery found *      Indwelling Lines/Drains at time of discharge:   Lines/Drains/Airways     Pressure Ulcer                 Pressure Ulcer 03/28/17 0715 Left heel suspected deep tissue injury 4 days              Hospital Course:   Ms. Rodrigues was admitted with respiratory distress with hypoxia due to pneumonia and possible volume overload state. Pt was empirically treated with Zosyn/Vanc, and all cultures NGTD. Pt had signs of possible CHF exacerbation and patient had ECHO checked which showed normal EF but with diastolic dysfunction s/p pulse dose IV lasix with improvement of symptoms. Pt also reported chronic left knee pain with effusion which was evaluated by Ortho to be arthritis and not septic. Palliative care followed, and addressed goals of  care. Pt and family wanted DNR status and a LaPOST was filled out. The patient's daughter would like to pursue home visits by (MD or NP) once the patient is discharged and the patient was resumed with home health. Pt had poorly controlled hypertension and her B-blocker was titrated after her heart rate was stabilized. She had episode of bradycardia during hospital stay and decrease in mentation which was secondary to clonidine. After PRN clonidine was stopped, she had no further bradycardia and her HR tolerated beta-blockade. Pt continued to do well and was de-escalated to Augmentin on discharge to complete her treatment of her pneumonia and she was fully compensated on her volume status.     Consults:   Consults         Status Ordering Provider     Inpatient consult to Palliative Care  Once     Provider:  Virginia Lucero RN    Completed TRESA QUINN     Inpatient consult to Spiritual Care  Once     Provider:  (Not yet assigned)    TRESA Mendez          Significant Diagnostic Studies:  ECHO (3/27/17):    1 - Low normal to mildly depressed left ventricular systolic function (EF 50-55%).     2 - Eccentric hypertrophy.     3 - Left ventricular diastolic dysfunction.       Pending Diagnostic Studies:     None        Final Active Diagnoses:    Diagnosis Date Noted POA    PRINCIPAL PROBLEM:  Pneumonia of both lungs due to infectious organism [J18.9] 03/27/2017 Yes    CKD Stage IV [N18.4] 08/28/2016 Yes     Chronic    Bradycardia [R00.1] 04/01/2017 No    Hypokalemia [E87.6] 03/31/2017 No    Encephalopathy, metabolic [G93.41] 03/30/2017 Yes    Anemia of chronic disease [D63.8] 03/27/2017 Yes    Leukocytosis [D72.829] 03/27/2017 Yes    Moderate protein malnutrition [E44.0] 03/27/2017 Yes    H/O: CVA (cerebrovascular accident) [Z86.73] 03/27/2017 Not Applicable    Debility [R53.81] 03/27/2017 Yes    DNR (do not resuscitate) [Z66]  Yes    Hypertension [I10]  Yes     Chronic    Hyperlipidemia [E78.5]   "Yes     Chronic    Hypothyroidism [E03.9]  Yes     Chronic    Degenerative joint disease involving multiple joints [M15.9]  Yes      Problems Resolved During this Admission:    Diagnosis Date Noted Date Resolved POA    Type 2 diabetes mellitus, uncontrolled, with renal complications [E11.29, E11.65]  03/02/2017 Yes     Chronic        Discharged Condition: good    Disposition: Home or Self Care    Follow Up:  Follow-up Information     Follow up with [facility] On 4/5/2017.    Specialty:  Priority Care    Why:  out patient services:  3:00pm follow up from the hospital    Contact information:    [address]  [facility] [address] [phone]        Follow up with [facility] On 4/2/2017.    Specialty:  Home Health Services    Why:  Home Health    Contact information:    [address]  [place] [id number] [phone]          Patient Instructions:     Diet general   Order Comments: 2000 ADA.     Activity as tolerated       Medications:  Reconciled Home Medications:   Discharge Medication List as of 4/1/2017 11:55 AM      START taking these medications    Details   amoxicillin-clavulanate 875-125mg (AUGMENTIN) 875-125 mg per tablet Take 1 tablet by mouth every 12 (twelve) hours., Starting 4/1/2017, Until Discontinued, Normal         CONTINUE these medications which have CHANGED    Details   hydrALAZINE (APRESOLINE) 25 MG tablet Take 2 tablets (50 mg total) by mouth every 12 (twelve) hours., Starting 4/1/2017, Until Sun 4/1/18, Normal         CONTINUE these medications which have NOT CHANGED    Details   aspirin (ECOTRIN) 81 MG EC tablet Take 1 tablet by mouth Daily. , Until Discontinued, Historical Med      BD ULTRA-FINE LILIAN PEN NEEDLES 32 gauge x 5/32" Ndle use with insulin FOUR TIMES DAILY, Normal      calcium carbonate-vitamin D3 (CALTRATE 600 + D) 600 mg(1,500mg) -400 unit Chew Take 1 tablet by mouth once daily. , Until Discontinued, Historical Med    " "  diaper,brief,adult,disposable Misc Change 5 times per day, Normal      DUREZOL 0.05 % Drop ophthalmic solution Starting 3/7/2017, Until Discontinued, Historical Med      gabapentin (NEURONTIN) 300 MG capsule TAKE ONE TABLET BY MOUTH TWICE DAILY, Normal      insulin glargine (LANTUS SOLOSTAR) 100 unit/mL (3 mL) InPn pen Inject 20 Units into the skin every evening., Starting 2/7/2017, Until Discontinued, Normal      latanoprost 0.005 % ophthalmic solution Starting 3/7/2017, Until Discontinued, Historical Med      levothyroxine (SYNTHROID) 50 MCG tablet Take 1 tablet (50 mcg total) by mouth before breakfast., Starting 2/7/2017, Until Discontinued, Normal      metoprolol tartrate (LOPRESSOR) 100 MG tablet Take 1 tablet (100 mg total) by mouth 2 (two) times daily., Starting 2/7/2017, Until Discontinued, Normal      NOVOLOG FLEXPEN 100 unit/mL InPn pen INJECT 15 UNITS UNDER THE SKIN THREE TIMES DAILY WITH MEALS, Normal      omega 3-dha-epa-fish oil (FISH OIL) 100-160-1,000 mg Cap Take 1 tablet by mouth Daily. , Until Discontinued, Historical Med      paroxetine (PAXIL) 20 MG tablet Take 1 tablet (20 mg total) by mouth every morning., Starting 2/7/2017, Until Discontinued, Normal      potassium chloride SA (K-DUR,KLOR-CON) 10 MEQ tablet TAKE ONE TABLET BY MOUTH ONCE DAILY FOR POTASSIUM, Normal      pravastatin (PRAVACHOL) 20 MG tablet Take 1 tablet (20 mg total) by mouth once daily., Starting 2/7/2017, Until Discontinued, Normal      SURE COMFORT INSULIN SYRINGE 1/2 mL 31 x 5/16" Syrg USE ALONG WITH INSULIN FOUR TIMES DAILY, Normal      valsartan (DIOVAN) 160 MG tablet Take 1 tablet (160 mg total) by mouth once daily., Starting 2/7/2017, Until Discontinued, Normal      walker (ULTRA-LIGHT ROLLATOR) Misc 1 Units by Misc.(Non-Drug; Combo Route) route once daily., Starting 1/8/2015, Until Discontinued, Print         STOP taking these medications       lancets 30 gauge Misc Comments:   Reason for Stopping:         metoprolol " succinate (TOPROL-XL) 50 MG 24 hr tablet Comments:   Reason for Stopping:             Time spent on the discharge of patient: 45 minutes    Sisi Salas MD  Department of Hospital Medicine  [facility]

## 2017-04-01 NOTE — PLAN OF CARE
[facility]    HOME HEALTH ORDERS  FACE TO FACE ENCOUNTER    Patient Name: Maria D Rodrigues  YOB: 1931    PCP: Carla Guerrero MD   PCP Address: [address] / [place] [address]  PCP Phone Number: [phone]  PCP Fax: [phone]    Encounter Date: 04/01/2017    Admit to Home Health    Diagnoses:  Active Hospital Problems    Diagnosis  POA    *Pneumonia of both lungs due to infectious organism [J18.9]  Yes     Priority: 1 - High    CKD Stage IV [N18.4]  Yes     Priority: 24      Chronic    Bradycardia [R00.1]  No    Hypokalemia [E87.6]  No    Encephalopathy, metabolic [G93.41]  Yes    Anemia of chronic disease [D63.8]  Yes    Leukocytosis [D72.829]  Yes    Moderate protein malnutrition [E44.0]  Yes    H/O: CVA (cerebrovascular accident) [Z86.73]  Not Applicable    Debility [R53.81]  Yes    DNR (do not resuscitate) [Z66]  Yes    Hypertension [I10]  Yes     Chronic    Hyperlipidemia [E78.5]  Yes     Chronic    Hypothyroidism [E03.9]  Yes     Chronic     hypothyroidism      Degenerative joint disease involving multiple joints [M15.9]  Yes      Resolved Hospital Problems    Diagnosis Date Resolved POA    Type 2 diabetes mellitus, uncontrolled, with renal complications [E11.29, E11.65] 03/02/2017 Yes     Chronic     insulin dependent         No future appointments.  Follow-up Information     Follow up with Carla Guerrero MD In 1 week.    Specialty:  Family Medicine    Contact information:    [address] [place] [address]  [phone]              I have seen and examined this patient face to face today. My clinical findings that support the need for the home health skilled services and home bound status are the following:  Weakness/numbness causing balance and gait disturbance due to Stroke, Infection and Weakness/Debility making it taxing to leave home.    Allergies:Review of patient's allergies indicates:  No Known Allergies    Diet:  "diabetic diet: 2000 calorie and 2 gram sodium diet    Activities: activity as tolerated    Nursing:   SN to complete comprehensive assessment including routine vital signs. Instruct on disease process and s/s of complications to report to MD. Review/verify medication list sent home with the patient at time of discharge  and instruct patient/caregiver as needed. Frequency may be adjusted depending on start of care date.    Notify MD if SBP > 160 or < 90; DBP > 90 or < 50; HR > 120 or < 50; Temp > 101.      CONSULTS:     to evaluate for community resources/long-range planning.  Aide to provide assistance with personal care, ADLs, and vital signs.      Medications: Review discharge medications with patient and family and provide education.      Current Discharge Medication List      START taking these medications    Details   amoxicillin-clavulanate 875-125mg (AUGMENTIN) 875-125 mg per tablet Take 1 tablet by mouth every 12 (twelve) hours.  Qty: 14 tablet, Refills: 0         CONTINUE these medications which have CHANGED    Details   hydrALAZINE (APRESOLINE) 25 MG tablet Take 2 tablets (50 mg total) by mouth every 12 (twelve) hours.  Qty: 60 tablet, Refills: 1         CONTINUE these medications which have NOT CHANGED    Details   aspirin (ECOTRIN) 81 MG EC tablet Take 1 tablet by mouth Daily.       BD ULTRA-FINE LILIAN PEN NEEDLES 32 gauge x 5/32" Ndle use with insulin FOUR TIMES DAILY  Qty: 400 each, Refills: 5      calcium carbonate-vitamin D3 (CALTRATE 600 + D) 600 mg(1,500mg) -400 unit Chew Take 1 tablet by mouth once daily.       diaper,brief,adult,disposable Misc Change 5 times per day  Qty: 100 each, Refills: 11      DUREZOL 0.05 % Drop ophthalmic solution       gabapentin (NEURONTIN) 300 MG capsule TAKE ONE TABLET BY MOUTH TWICE DAILY  Qty: 180 capsule, Refills: 0      insulin glargine (LANTUS SOLOSTAR) 100 unit/mL (3 mL) InPn pen Inject 20 Units into the skin every evening.  Qty: 15 mL, Refills: 5    " "Associated Diagnoses: Type 2 diabetes mellitus with stage 4 chronic kidney disease, with long-term current use of insulin      latanoprost 0.005 % ophthalmic solution       levothyroxine (SYNTHROID) 50 MCG tablet Take 1 tablet (50 mcg total) by mouth before breakfast.  Qty: 90 tablet, Refills: 1    Comments: Need follow up to discuss labs  Associated Diagnoses: Hypothyroidism, unspecified type      metoprolol tartrate (LOPRESSOR) 100 MG tablet Take 1 tablet (100 mg total) by mouth 2 (two) times daily.  Qty: 180 tablet, Refills: 1    Associated Diagnoses: Benign hypertensive heart disease without congestive heart failure      NOVOLOG FLEXPEN 100 unit/mL InPn pen INJECT 15 UNITS UNDER THE SKIN THREE TIMES DAILY WITH MEALS  Qty: 45 mL, Refills: 0      omega 3-dha-epa-fish oil (FISH OIL) 100-160-1,000 mg Cap Take 1 tablet by mouth Daily.       paroxetine (PAXIL) 20 MG tablet Take 1 tablet (20 mg total) by mouth every morning.  Qty: 90 tablet, Refills: 1    Associated Diagnoses: Major depressive disorder, single episode, severe without psychotic features      potassium chloride SA (K-DUR,KLOR-CON) 10 MEQ tablet TAKE ONE TABLET BY MOUTH ONCE DAILY FOR POTASSIUM  Qty: 90 tablet, Refills: 1    Comments: This prescription was filled today(9/7/2016). Any refills authorized will be placed on file.      pravastatin (PRAVACHOL) 20 MG tablet Take 1 tablet (20 mg total) by mouth once daily.  Qty: 90 tablet, Refills: 1    Comments: This prescription was filled today. Any refills authorized will be placed on file.  Associated Diagnoses: Hyperlipidemia, unspecified hyperlipidemia type      SURE COMFORT INSULIN SYRINGE 1/2 mL 31 x 5/16" Syrg USE ALONG WITH INSULIN FOUR TIMES DAILY  Qty: 400 each, Refills: 12      valsartan (DIOVAN) 160 MG tablet Take 1 tablet (160 mg total) by mouth once daily.  Qty: 90 tablet, Refills: 1    Associated Diagnoses: Benign hypertensive heart disease without heart failure      walker (ULTRA-LIGHT " ROLLATOR) Misc 1 Units by Misc.(Non-Drug; Combo Route) route once daily.  Qty: 1 each, Refills: 0         STOP taking these medications       lancets 30 gauge Misc Comments:   Reason for Stopping:         metoprolol succinate (TOPROL-XL) 50 MG 24 hr tablet Comments:   Reason for Stopping:               I certify that this patient is confined to her home and needs intermittent skilled nursing care, physical therapy and occupational therapy.

## 2017-04-01 NOTE — PROGRESS NOTES
TN reviewed Discharge Pneumonia written  Instructions with pt and dtr Marleni.  Teach back conducted regarding managing pt health  At home..Antonia Smith RN, BSN, [facility]  4/1/2017     Med surg nurse Sarah informed that pt ready for d/c from cm viewpoint...Antonia Smith RN, BSN, [facility]  4/1/2017

## 2017-04-01 NOTE — PROGRESS NOTES
Pt IV d/c w/tip intact 2x2 & tape applied, pt and dtr (Marleni) given discharge f/u & prescription info, pt & dtr verbalized understanding and all questions addressed, pt wheeled off [facility] w/all belongings and dtr at side per PCT wicho Daily

## 2017-04-01 NOTE — PROGRESS NOTES
WRITTEN DISCHARGE INFORMATION:  Follow-up Information     Follow up with [facility] On 4/5/2017.    Specialty:  Priority Care    Why:  out patient services:  3:00pm follow up from the hospital    Contact information:    [address]  [facility] [address] [phone]        Follow up with [facility] On 4/2/2017.    Specialty:  Home Health Services    Why:  Home Health    Contact information:    [address]  [facility] [address]  [phone]                                                                Help at Home  After discharge for assistance [facility] 24/7 assistance  [phone]     Thank you for choosing [facility] for your care.  Please answer any calls you may receive from [facility] we want to continue to support you as you manage your healthcare needs. [facility] is happy to have the opportunity to serve you.   Sincerely, Your [facility] Healthcare Manager is,  Antonia Smith RN [facility] [phone]

## 2017-04-01 NOTE — PROGRESS NOTES
Pt daughter asked that pt BG be taken due to her mother being confused. She then ask for her mother to receive a coke. RN went into room with PCT. While RN assess pt PCT took pt BG. PT answered correctly to all orientation questions and did not appear in distress. PT stated her full name, name of location, and who her daughter was along with why she felt she was in the hospital. Pt BG was 185. No coverage was provided per tabby order. RN educated pt's daughter that pt BG was not low and that a coke may rise pt BG  Higher.

## 2017-04-01 NOTE — PLAN OF CARE
Problem: Patient Care Overview  Goal: Plan of Care Review  Outcome: Ongoing (interventions implemented as appropriate)  Pt free from falls and injury. Breath sounds sound clearer. Pt BP still elevated. Still has periods of confusion.

## 2017-04-02 NOTE — PROGRESS NOTES
4/2/2017 TN resent orders to PHN for resumption of [facility] from yesterday...Antonia Smith RN, BSN, [facility]  4/2/2017

## 2017-04-02 NOTE — PLAN OF CARE
04/01/17 1340   Final Note   Assessment Type Final Discharge Note   Discharge Disposition Home   Discharge planning education complete? Yes   What phone number can be called within the next 1-3 days to see how you are doing after discharge? (see chart.)   Hospital Follow Up  Appt(s) scheduled? Yes   Discharge plans and expectations educations in teach back method with documentation complete? Yes   Offered [facility] -- Bedside Delivery? n/a   Discharge/Hospital Encounter Summary to ([facility]) PCP n/a   Referral to Outpatient Case Management complete? n/a   Referral to / orders for Home Health Complete? n/a   30 day supply of medicines given at discharge, if documented non-compliance / non-adherence? n/a   Any social issues identified prior to discharge? No   Did you assess the readiness or willingness of the family or caregiver to support self management of care? Yes   Right Care Referral Info   Post Acute Recommendation Home-care   Referral Type ([facility])

## 2017-04-05 ENCOUNTER — OFFICE VISIT (OUTPATIENT)
Dept: PRIMARY CARE CLINIC | Facility: CLINIC | Age: 82
End: 2017-04-05
Payer: MEDICARE

## 2017-04-05 VITALS
SYSTOLIC BLOOD PRESSURE: 138 MMHG | WEIGHT: 171.94 LBS | RESPIRATION RATE: 20 BRPM | BODY MASS INDEX: 27.63 KG/M2 | HEART RATE: 75 BPM | HEIGHT: 66 IN | TEMPERATURE: 98 F | OXYGEN SATURATION: 95 % | DIASTOLIC BLOOD PRESSURE: 68 MMHG

## 2017-04-05 DIAGNOSIS — I11.9 BENIGN HYPERTENSIVE HEART DISEASE WITHOUT CONGESTIVE HEART FAILURE: ICD-10-CM

## 2017-04-05 DIAGNOSIS — R00.1 BRADYCARDIA: ICD-10-CM

## 2017-04-05 DIAGNOSIS — N18.4 CKD (CHRONIC KIDNEY DISEASE), STAGE IV: Chronic | ICD-10-CM

## 2017-04-05 DIAGNOSIS — J18.9 PNEUMONIA OF BOTH LUNGS DUE TO INFECTIOUS ORGANISM, UNSPECIFIED PART OF LUNG: Primary | ICD-10-CM

## 2017-04-05 DIAGNOSIS — E11.00 UNCONTROLLED TYPE 2 DIABETES MELLITUS WITH HYPEROSMOLARITY WITHOUT COMA, WITH LONG-TERM CURRENT USE OF INSULIN: ICD-10-CM

## 2017-04-05 DIAGNOSIS — E78.5 HYPERLIPIDEMIA, UNSPECIFIED HYPERLIPIDEMIA TYPE: Chronic | ICD-10-CM

## 2017-04-05 DIAGNOSIS — A04.72 C. DIFFICILE DIARRHEA: ICD-10-CM

## 2017-04-05 DIAGNOSIS — R68.89 SUSPECTED DEEP TISSUE INJURY: ICD-10-CM

## 2017-04-05 DIAGNOSIS — Z79.4 UNCONTROLLED TYPE 2 DIABETES MELLITUS WITH HYPEROSMOLARITY WITHOUT COMA, WITH LONG-TERM CURRENT USE OF INSULIN: ICD-10-CM

## 2017-04-05 PROCEDURE — 99214 OFFICE O/P EST MOD 30 MIN: CPT | Mod: S$GLB,,, | Performed by: NURSE PRACTITIONER

## 2017-04-05 PROCEDURE — 99499 UNLISTED E&M SERVICE: CPT | Mod: S$PBB,,, | Performed by: NURSE PRACTITIONER

## 2017-04-05 PROCEDURE — 99999 PR PBB SHADOW E&M-EST. PATIENT-LVL V: CPT | Mod: PBBFAC,,,

## 2017-04-05 RX ORDER — AMLODIPINE BESYLATE 10 MG/1
10 TABLET ORAL DAILY
COMMUNITY

## 2017-04-05 RX ORDER — CYCLOPENTOLATE HYDROCHLORIDE 20 MG/ML
SOLUTION/ DROPS OPHTHALMIC
Refills: 0 | COMMUNITY
Start: 2017-03-08

## 2017-04-05 RX ORDER — METOPROLOL TARTRATE 100 MG/1
50 TABLET ORAL 2 TIMES DAILY
Qty: 180 TABLET | Refills: 1 | Status: SHIPPED | OUTPATIENT
Start: 2017-04-05

## 2017-04-05 NOTE — MR AVS SNAPSHOT
[facility] [address]  [place] [address]  Phone: [phone]  Fax: [phone]                  Maria D Rodrigues   2017 3:00 PM   Office Visit    Description:  Female : 1931   Provider:  [facility]   Department:  [facility]           Reason for Visit     Hospital Follow Up           Diagnoses this Visit        Comments    Pneumonia of both lungs due to infectious organism, unspecified part of lung    -  Primary     C. difficile diarrhea         Benign hypertensive heart disease without congestive heart failure         Hyperlipidemia, unspecified hyperlipidemia type         CKD (chronic kidney disease), stage IV         Essential hypertension                To Do List           Future Appointments        Provider Department Dept Phone    2017 1:00 PM Nicole Lopez PA-C [facility] [phone]    2017 3:45 PM Latha Melchor DPM [facility] [phone]      Goals (5 Years of Data)     None       These Medications        Disp Refills Start End    metoprolol tartrate (LOPRESSOR) 100 MG tablet 180 tablet 1 2017     Take 0.5 tablets (50 mg total) by mouth 2 (two) times daily. - Oral    Pharmacy: [address] #: [phone]         [facility]     [facility] Nurse Care [facility] -  Assistance  Unless otherwise directed by your provider, please contact [facility], our nurse care line that is available for  assistance.     Registered nurses in the [facility] provide: appointment scheduling, clinical advisement, health education, and other advisory services.  Call: [phone] (toll free)               Medications           Message regarding Medications     Verify the changes and/or additions to your medication regime listed below are the same as discussed with your clinician today.  If any of these changes or additions are  "incorrect, please notify your healthcare provider.        CHANGE how you are taking these medications     Start Taking Instead of    metoprolol tartrate (LOPRESSOR) 100 MG tablet metoprolol tartrate (LOPRESSOR) 100 MG tablet    Dosage:  Take 0.5 tablets (50 mg total) by mouth 2 (two) times daily. Dosage:  Take 1 tablet (100 mg total) by mouth 2 (two) times daily.    Reason for Change:  Reorder            Verify that the below list of medications is an accurate representation of the medications you are currently taking.  If none reported, the list may be blank. If incorrect, please contact your healthcare provider. Carry this list with you in case of emergency.           Current Medications     amlodipine (NORVASC) 10 MG tablet Take 10 mg by mouth once daily.    amoxicillin-clavulanate 875-125mg (AUGMENTIN) 875-125 mg per tablet Take 1 tablet by mouth every 12 (twelve) hours.    aspirin (ECOTRIN) 81 MG EC tablet Take 1 tablet by mouth Daily.     BD ULTRA-FINE LILIAN PEN NEEDLES 32 gauge x 5/32" Ndle use with insulin FOUR TIMES DAILY    calcium carbonate-vitamin D3 (CALTRATE 600 + D) 600 mg(1,500mg) -400 unit Chew Take 1 tablet by mouth once daily.     cyclopentolate (CYCLOGYL) 2 % ophthalmic solution PLACE 1 DROP INTO LEFT EYE TWICE A DAY    diaper,brief,adult,disposable Misc Change 5 times per day    DUREZOL 0.05 % Drop ophthalmic solution     gabapentin (NEURONTIN) 300 MG capsule TAKE ONE TABLET BY MOUTH TWICE DAILY    hydrALAZINE (APRESOLINE) 25 MG tablet Take 2 tablets (50 mg total) by mouth every 12 (twelve) hours.    insulin glargine (LANTUS SOLOSTAR) 100 unit/mL (3 mL) InPn pen Inject 20 Units into the skin every evening.    latanoprost 0.005 % ophthalmic solution     levothyroxine (SYNTHROID) 50 MCG tablet Take 1 tablet (50 mcg total) by mouth before breakfast.    metoprolol tartrate (LOPRESSOR) 100 MG tablet Take 0.5 tablets (50 mg total) by mouth 2 (two) times daily.    multivitamin with minerals tablet Take " "1 tablet by mouth once daily.    NOVOLOG FLEXPEN 100 unit/mL InPn pen INJECT 15 UNITS UNDER THE SKIN THREE TIMES DAILY WITH MEALS    omega 3-dha-epa-fish oil (FISH OIL) 100-160-1,000 mg Cap Take 1 tablet by mouth Daily.     paroxetine (PAXIL) 20 MG tablet Take 1 tablet (20 mg total) by mouth every morning.    potassium chloride SA (K-DUR,KLOR-CON) 10 MEQ tablet TAKE ONE TABLET BY MOUTH ONCE DAILY FOR POTASSIUM    pravastatin (PRAVACHOL) 20 MG tablet Take 1 tablet (20 mg total) by mouth once daily.    SURE COMFORT INSULIN SYRINGE 1/2 mL 31 x 5/16" Syrg USE ALONG WITH INSULIN FOUR TIMES DAILY    valsartan (DIOVAN) 160 MG tablet Take 1 tablet (160 mg total) by mouth once daily.    walker (ULTRA-LIGHT ROLLATOR) Misc 1 Units by Misc.(Non-Drug; Combo Route) route once daily.           Clinical Reference Information           Your Vitals Were     BP Pulse Temp Resp Height Weight    138/68 (BP Location: Right arm, Patient Position: Sitting, BP Method: Automatic) 75 98.1 °F (36.7 °C) (Oral) 20 5' 6" (1.676 m) 78 kg (171 lb 15.3 oz)    SpO2 BMI             95% 27.75 kg/m2         Blood Pressure          Most Recent Value    BP  138/68      Allergies as of 4/5/2017     No Known Allergies      Immunizations Administered on Date of Encounter - 4/5/2017     None      Orders Placed During Today's Visit     Future Labs/Procedures Expected by Expires    Basic metabolic panel  4/5/2017 6/4/2018    CBC auto differential  4/5/2017 6/4/2018      [facility] Sign-Up     Activating your [facility] account is as easy as 1-2-3!     1) Visit [email or web address], select Sign Up Now, enter this activation code and your date of birth, then select Next.  [email or web address]  Expires: 4/16/2017 12:45 PM      2) Create a username and password to use when you visit [facility] in the future and select a security question in case you lose your password and select Next.    3) Enter your e-mail address and click Sign Up!    Additional Information  If you have " questions, please e-mail [email or web address] or call [phone] to talk to our [facility] staff. Remember, [facility] is NOT to be used for urgent needs. For medical emergencies, dial 911.         Language Assistance Services     ATTENTION: Language assistance services are available, free of charge. Please call [phone].      ATENCIÓN: Si habla español, tiene a pichrado disposición servicios gratuitos de asistencia lingüística. Llame al [phone].     CHÚ Ý: N?u b?n nói Ti?ng Vi?t, có các d?ch v? h? tr? ngôn ng? mi?n phí dành cho b?n. G?i s? [phone].         [facility] complies with applicable Federal civil rights laws and does not discriminate on the basis of race, color, national origin, age, disability, or sex.

## 2017-04-05 NOTE — PROGRESS NOTES
[facility]  New Visit Progress Note   Recent Hospital Discharge          ATTENDING PHYSICIAN:  Dr. SHIVA Llanos  Current Provider:  EVE Baker  PRESENTING HISTORY     Chief Complaint/Reason for Visit:  Follow up Hospital Discharge   Chief Complaint   Patient presents with    Hospital Follow Up     PCP: Carla Guerrero MD    History of Present Illness: 84 y/o female with dementia, HTN, HLP, uncontrolled DM2, CKD IV, and h/o CVA who presented with SOB. Daughter at the bedside reported she noticed that the patient had wheezing that started 2 days ago and then last night became more SOB. She had increasing cough, but she was unsure if the patient had any fever or chills. Earlier this month, patient was admitted for observation for influenza A and UTI. Pt patient is able to answer simple questions, but most of the history is obtained through the daughter who is the primary care giver. She also reported chronic left knee pain that limits patient's mobility. Pt does transfers and sits in chair but does not do much otherwise. In the ER, patient was noted to be hypoxic with sats of 84% in RA and workup was remarkable for probable infiltrates consistent with pneumonia.     * No surgery found *       Indwelling Lines/Drains at time of discharge:       Lines/Drains/Airways            Pressure Ulcer                             Pressure Ulcer 03/28/17 0715 Left heel suspected deep tissue injury 4 days                   Hospital Course:   Ms. Rodrigues was admitted with respiratory distress with hypoxia due to pneumonia and possible volume overload state. Pt was empirically treated with Zosyn/Vanc, and all cultures NGTD. Pt had signs of possible CHF exacerbation and patient had ECHO checked which showed normal EF but with diastolic dysfunction s/p pulse dose IV lasix with improvement of symptoms. Pt also reported chronic left knee pain with effusion which was evaluated by Ortho to be arthritis and not septic.  Palliative care followed, and addressed goals of care. Pt and family wanted DNR status and a LaPOST was filled out. The patient's daughter would like to pursue home visits by (MD or NP) once the patient is discharged and the patient was resumed with home health. Pt had poorly controlled hypertension and her B-blocker was titrated after her heart rate was stabilized. She had episode of bradycardia during hospital stay and decrease in mentation which was secondary to clonidine. After PRN clonidine was stopped, she had no further bradycardia and her HR tolerated beta-blockade. Pt continued to do well and was de-escalated to Augmentin on discharge to complete her treatment of her pneumonia and she was fully compensated on her volume status.     Today: Patient presents today for follow-up evaluation post hospitalization for PNA +/- acute CHF excerebration. Patient's daughter reports she is still on abx for PNA, and only reports MARTINEZ with positional changes, denies cough and sputum production. Additionally, daughter reports patient was seen at [facility] after being discharge from [facility] for blood positive diarrhea - diagnosed with C-diff and was started on Flagyl which patient will start today. Today she denies bloody liquid stools.  Daughter also concerned about left heal with discolored small area.           Review of Systems:  Review of Systems   Constitutional: Negative for chills, diaphoresis and fever.   HENT: Negative for congestion.    Eyes: Negative for blurred vision.   Respiratory: Negative for cough and shortness of breath.    Cardiovascular: Negative for chest pain, palpitations and leg swelling.   Gastrointestinal: Negative for abdominal pain, constipation, diarrhea, nausea and vomiting.   Genitourinary: Negative for dysuria.   Musculoskeletal: Negative for falls and myalgias.   Skin:        Left heel with closed discolored wound   Neurological: Negative for dizziness, weakness and headaches.         PAST HISTORY:  "    Past Medical History:   Diagnosis Date    Chronic kidney disease     CKD (chronic kidney disease) stage 4, GFR 15-29 ml/min     Degenerative joint disease involving multiple joints     Dementia     Diabetes mellitus type II     insulin dependent    Diabetes mellitus with neuropathy     DNR (do not resuscitate)     HTN (hypertension)     Hyperlipidemia     Stroke     Thyroid disease     hypothyroidism    Thyroid nodule        Past Surgical History:   Procedure Laterality Date    APPENDECTOMY      BACK SURGERY      CATARACT EXTRACTION, BILATERAL       SECTION      CHOLECYSTECTOMY      HYSTERECTOMY         Family History   Problem Relation Age of Onset    Learning disabilities Sister     Diabetes Mother        Social History     Social History    Marital status:      Spouse name: N/A    Number of children: 5    Years of education: 12 yrs       Occupational History    retired from [employer]      Social History Main Topics    Smoking status: Former Smoker     Quit date: 1956    Smokeless tobacco: None    Alcohol use No    Drug use: No    Sexual activity: No     Other Topics Concern    None     Social History Narrative    Disable sister lives with her.       MEDICATIONS & ALLERGIES:     Current Outpatient Prescriptions on File Prior to Visit   Medication Sig Dispense Refill    amoxicillin-clavulanate 875-125mg (AUGMENTIN) 875-125 mg per tablet Take 1 tablet by mouth every 12 (twelve) hours. 14 tablet 0    aspirin (ECOTRIN) 81 MG EC tablet Take 1 tablet by mouth Daily.       BD ULTRA-FINE LILIAN PEN NEEDLES 32 gauge x 5/32" Ndle use with insulin FOUR TIMES DAILY 400 each 5    calcium carbonate-vitamin D3 (CALTRATE 600 + D) 600 mg(1,500mg) -400 unit Chew Take 1 tablet by mouth once daily.       diaper,brief,adult,disposable Misc Change 5 times per day 100 each 11    DUREZOL 0.05 % Drop ophthalmic solution       gabapentin (NEURONTIN) 300 MG capsule TAKE " "ONE TABLET BY MOUTH TWICE DAILY 180 capsule 0    hydrALAZINE (APRESOLINE) 25 MG tablet Take 2 tablets (50 mg total) by mouth every 12 (twelve) hours. 60 tablet 1    insulin glargine (LANTUS SOLOSTAR) 100 unit/mL (3 mL) InPn pen Inject 20 Units into the skin every evening. 15 mL 5    latanoprost 0.005 % ophthalmic solution       levothyroxine (SYNTHROID) 50 MCG tablet Take 1 tablet (50 mcg total) by mouth before breakfast. 90 tablet 1    metoprolol tartrate (LOPRESSOR) 100 MG tablet Take 1 tablet (100 mg total) by mouth 2 (two) times daily. 180 tablet 1    NOVOLOG FLEXPEN 100 unit/mL InPn pen INJECT 15 UNITS UNDER THE SKIN THREE TIMES DAILY WITH MEALS 45 mL 0    omega 3-dha-epa-fish oil (FISH OIL) 100-160-1,000 mg Cap Take 1 tablet by mouth Daily.       paroxetine (PAXIL) 20 MG tablet Take 1 tablet (20 mg total) by mouth every morning. 90 tablet 1    potassium chloride SA (K-DUR,KLOR-CON) 10 MEQ tablet TAKE ONE TABLET BY MOUTH ONCE DAILY FOR POTASSIUM 90 tablet 1    pravastatin (PRAVACHOL) 20 MG tablet Take 1 tablet (20 mg total) by mouth once daily. 90 tablet 1    SURE COMFORT INSULIN SYRINGE 1/2 mL 31 x 5/16" Syrg USE ALONG WITH INSULIN FOUR TIMES DAILY 400 each 12    valsartan (DIOVAN) 160 MG tablet Take 1 tablet (160 mg total) by mouth once daily. 90 tablet 1    walker (ULTRA-LIGHT ROLLATOR) Misc 1 Units by Misc.(Non-Drug; Combo Route) route once daily. 1 each 0    [DISCONTINUED] lancets 30 gauge Misc Inject 1 lancet as directed 4 (four) times daily. 150 each 12    [DISCONTINUED] metoprolol succinate (TOPROL-XL) 50 MG 24 hr tablet Take 1 tablet (50 mg total) by mouth once daily. 1 Tablet Extended Release 24 hr Oral Every day 30 tablet 5     No current facility-administered medications on file prior to visit.         Review of patient's allergies indicates:  No Known Allergies    OBJECTIVE:     Vital Signs:  Vitals:    04/05/17 1510   BP: (!) 169/74   Pulse: 75   Resp: 20   Temp: 98.1 °F (36.7 °C) "     Wt Readings from Last 1 Encounters:   04/05/17 1510 78 kg (171 lb 15.3 oz)     Body mass index is 27.75 kg/(m^2).     Physical Exam:  Physical Exam   Constitutional: She is oriented to person, place, and time. No distress.   HENT:   Head: Normocephalic and atraumatic.   Eyes: Conjunctivae and EOM are normal. Pupils are equal, round, and reactive to light.   Neck: Normal range of motion. Neck supple. No JVD present. No tracheal deviation present.   Cardiovascular: Normal rate, regular rhythm, normal heart sounds and intact distal pulses.    Pulmonary/Chest: No respiratory distress.   Abdominal: Soft. Bowel sounds are normal. She exhibits no distension. There is no tenderness.   Musculoskeletal: Normal range of motion.        Feet:    Neurological: She is alert and oriented to person, place, and time.   Skin: Skin is warm and dry. She is not diaphoretic.   Psychiatric: Mood, memory, affect and judgment normal.           Laboratory  Lab Results   Component Value Date    WBC 10.60 03/29/2017    HGB 9.6 (L) 03/29/2017    HCT 31.0 (L) 03/29/2017    MCV 96 03/29/2017     03/29/2017     BMP  Lab Results   Component Value Date     04/01/2017    K 3.3 (L) 04/01/2017     04/01/2017    CO2 28 04/01/2017    BUN 33 (H) 04/01/2017    CREATININE 2.1 (H) 04/01/2017    CALCIUM 9.2 04/01/2017    ANIONGAP 9 04/01/2017    ESTGFRAFRICA 24 (A) 04/01/2017    EGFRNONAA 21 (A) 04/01/2017     Lab Results   Component Value Date    ALT 33 03/27/2017    AST 47 (H) 03/27/2017    ALKPHOS 102 03/27/2017    BILITOT 0.4 03/27/2017     No results found for: INR, PROTIME  Lab Results   Component Value Date    HGBA1C 11.3 (H) 03/09/2017     No results for input(s): POCTGLUCOSE in the last 72 hours.    Diagnostic Results:  Imaging Results     None            TRANSITION OF CARE:     [facility] Contact Note: 03/27/2017    Family and/or Caretaker present at visit?  Yes.  Diagnostic tests reviewed/disposition: I have reviewed all  completed as well as pending diagnostic tests at the time of discharge.  Disease/illness education: Bilat PNA/Acute diastolic heart failure    Home health/community services discussion/referrals: Patient has home health established at [facility].   Establishment or re-establishment of referral orders for community resources: No other necessary community resources.   Discussion with other health care providers: No discussion with other health care providers necessary.     Medications Reconciliation:   I have reconciled the patient's home medications and discharge medications with the patient/family. I have updated all changes.  Refer to After-Visit Medication List.    ASSESSMENT & PLAN:     HIGH RISK CONDITION(S):  Patient has a condition that poses threat to life and bodily function: Respiratory Distress 2/2 PNA +/- CHF    Pneumonia of both lungs due to infectious organism, unspecified part of lung  Instructed to continue abx until complete.     C. difficile diarrhea  Recently seen at [facility] and was found to have C-Diff; started on flagyl x 14.  Reports will  RX this afternoon. Instructed to take until complete.     Suspected deep tissue injury  Small left anterior heel DTI ~ 0.5 x 0.5 in size.  Instructed to monitor closely and apply mepilex to area to avoid pressure to area. Appointment scheduled for podiatry for evaluation of area    Benign hypertensive heart disease   Instructed to continue current antihypertensive medication regimen     Hyperlipidemia, unspecified hyperlipidemia type  Continue current statin. Defer repeat Lipid panel to PCP    DM II (HbA1c 11.3 March 2017)   Uncontrolled. Patient's daughter reports diet has since changed since patient is now living with her. Reports she did not give nightly insulin dose or novolog because blood sugar was 79.  Instructed to check blood sugars every AM and PM. Instructed that she can consider changing Lantus to AM if she has concerns of possible  "hypoglycemic events during the night.  Instructed to keep daily diary of blood sugar and bring with them at next follow-up visit.     CKD Stage IV  Stable.  Will repeat labs post hospitalization to assess if kidney function has returned to baseline  -     Basic metabolic panel; Future; Expected date: 4/5/17    Bradycardia  Daughter reports bradycardic episodes while inpatient. She tells me stopped the BB.  I have explained that I will cut dose in half.  Instructed to monitor BP and heart every AM and prior to administrating medication. Hold metoprolol for HR <60.  -     metoprolol tartrate (LOPRESSOR) 100 MG tablet; Take 0.5 tablets (50 mg total) by mouth 2 (two) times daily.  Dispense: 180 tablet; Refill: 1    Anemia of Chronic disease  -     CBC auto differential; Future; Expected date: 4/5/17      Instructions for the patient:      Scheduled Follow-up :  Future Appointments  Date Time Provider Department Center   4/12/2017 1:00 PM Nicole Lopez PA-C [facility] NELIA CAR [facility]   4/18/2017 3:45 PM [facility]       After Visit Medication List :     Medication List          This list is accurate as of: 4/5/17  3:16 PM.  Always use your most recent med list.                     amoxicillin-clavulanate 875-125mg 875-125 mg per tablet   Commonly known as:  AUGMENTIN   Take 1 tablet by mouth every 12 (twelve) hours.       aspirin 81 MG EC tablet   Commonly known as:  ECOTRIN       BD ULTRA-FINE LILIAN PEN NEEDLES 32 gauge x 5/32" Ndle   Generic drug:  pen needle, diabetic   use with insulin FOUR TIMES DAILY       CALTRATE 600 + D 600 mg(1,500mg) -400 unit Chew   Generic drug:  calcium carbonate-vitamin D3       diaper,brief,adult,disposable Misc   Change 5 times per day       DUREZOL 0.05 % Drop ophthalmic solution   Generic drug:  difluprednate       FISH -160-1,000 mg Cap   Generic drug:  omega 3-dha-epa-fish oil       gabapentin 300 MG capsule   Commonly known as:  NEURONTIN   TAKE ONE " TABLET BY MOUTH TWICE DAILY       hydrALAZINE 25 MG tablet   Commonly known as:  APRESOLINE   Take 2 tablets (50 mg total) by mouth every 12 (twelve) hours.       insulin glargine 100 unit/mL (3 mL) Inpn pen   Commonly known as:  LANTUS SOLOSTAR   Inject 20 Units into the skin every evening.       latanoprost 0.005 % ophthalmic solution       levothyroxine 50 MCG tablet   Commonly known as:  SYNTHROID   Take 1 tablet (50 mcg total) by mouth before breakfast.       metoprolol tartrate 100 MG tablet   Commonly known as:  LOPRESSOR   Take 1 tablet (100 mg total) by mouth 2 (two) times daily.       NOVOLOG FLEXPEN 100 unit/mL Inpn pen   Generic drug:  insulin aspart   INJECT 15 UNITS UNDER THE SKIN THREE TIMES DAILY WITH MEALS       paroxetine 20 MG tablet   Commonly known as:  PAXIL   Take 1 tablet (20 mg total) by mouth every morning.       potassium chloride SA 10 MEQ tablet   Commonly known as:  K-DUR,KLOR-CON   TAKE ONE TABLET BY MOUTH ONCE DAILY FOR POTASSIUM       pravastatin 20 MG tablet   Commonly known as:  PRAVACHOL   Take 1 tablet (20 mg total) by mouth once daily.       SURE COMFORT INSULIN SYRINGE 0.5 mL 31 gauge x 5/16 Syrg   Generic drug:  insulin syringe-needle U-100   USE ALONG WITH INSULIN FOUR TIMES DAILY       valsartan 160 MG tablet   Commonly known as:  DIOVAN   Take 1 tablet (160 mg total) by mouth once daily.       walker Misc   Commonly known as:  ULTRA-LIGHT ROLLATOR   1 Units by Misc.(Non-Drug; Combo Route) route once daily.                 ROSALIO Baker, FNP-C CCRN  Nurse Practitioner - Hospitalist  Department of Hospital Medicine  [facility]  [phone]

## 2017-05-14 NOTE — PROGRESS NOTES
[facility] Medicine  Progress Note     Patient Name: Maria D Rodrigues  MRN: [id number]  Patient Class: IP- Inpatient   Admission Date: 3/27/2017  Attending Physician: Sisi Salas MD  Primary Care Provider: Carla Guerrero MD           Subjective:      Principal Problem:Pneumonia of both lungs due to infectious organism     HPI:  84 y/o female with dementia, HTN, HLP, uncontrolled DM2, CKD IV, and h/o CVA who presented with SOB. Daughter at the bedside reported she noticed that the patient had wheezing that started 2 days ago and then last night became more SOB. She had increasing cough, but she was unsure if the patient had any fever or chills. Earlier this month, patient was admitted for observation for influenza A and UTI. Pt patient is able to answer simple questions, but most of the history is obtained through the daughter who is the primary care giver. She also reported chronic left knee pain that limits patient's mobility. Pt does transfers and sits in chair but does not do much otherwise. In the ER, patient was noted to be hypoxic with sats of 84% in RA and workup was remarkable for probable infiltrates consistent with pneumonia.     Hospital Course:  Ms. Rodrigues was admitted with respiratory distress with hypoxia due to pneumonia and possible volume overload state. Pt was empirically treated with Zosyn/Vanc, all culture NGTD. Pt had signs of possible CHF exacerbation and patient had ECHO checked which showed normal EF but with diastolic dysfunction s/p pulse dose IV lasix with improvement of symptoms. Pt also reported chronic left knee pain with effusion which was evaluated by Ortho to be arthritis and not septic.      Interval History: Pt with breathing little better today.     Review of Systems   Constitutional: Negative for fever.   HENT: Negative for sore throat.   Respiratory: Positive for cough and shortness of breath.   Cardiovascular: Negative for chest pain.       Objective:      Vital signs: Reviewed  Weight: 87.1 kg (192 lb)  Body mass index is 30.99 kg/(m^2).  Intake/Output Summary (Last 24 hours): Reviewed      Physical Exam   Constitutional: She appears well-developed and well-nourished.   Eyes: EOM are normal. Pupils are equal, round, and reactive to light.   Neck: Normal range of motion.   Cardiovascular: Normal rate and regular rhythm.   Pulmonary/Chest:   Course with bilateral wheezing and rhonchi.   Abdominal: Soft. Bowel sounds are normal.   Musculoskeletal:   Left knee with mild swelling but no erythema.   Neurological:   Awake and alert, oriented x 2.   Skin: Skin is warm.   Left heel pressure sore.         Significant Labs: All pertinent labs within the past 24 hours have been reviewed.     Significant Imaging: I have reviewed and interpreted all pertinent imaging results/findings within the past 24 hours.     Assessment/Plan:       * Pneumonia of both lungs due to infectious organism  * Acute diastolic CHF exacerbation  Respiratory distress with hypoxia  Assessment and Plan:  Pt with recent admission of influenza with appropriate treatment earlier this month, now with CXR concerning for pneumonia with possible CHF component as well. Pt treated with empiric Zosyn/Vanc and f/u with cultures, NEBS and titrate O2 as tolerated. Pt was on NRB yesterday morning but this improved after pulse IV lasix for 4-5L via NC. ECHO pending. Continue current care.     CKD Stage IV  Creatinine approximately at baseline, will monitor.     Hypertension  Continue home regimen.     Hyperlipidemia  Continue pravastatin.     Hypothyroidism  Continue levothyroxine. Checking TSH/FT4.     Degenerative joint disease involving multiple joints  Left knee pain  Assessment and Plan:  X-ray of knee with DDD/arthritis only. Appreciate Ortho input, not septic knee. Possible steroid injection if pain persists. Pain control.     DNR (do not resuscitate)  DNR confirmed with patient and family at  the bedside. Palliative care following to address future goals of care issues, educate family and get LaPOST done.     Anemia of chronic disease  H/H consistent with previous levels, no reported bleeding, monitor.     Leukocytosis  Secondary to pneumonia, monitor.     Moderate protein malnutrition  Will supplement with Boost glucose control.     H/O: CVA (cerebrovascular accident)  Continue ASA and statin.     Debility  Pt can do transfers but not really able to walk, PT/OT. Family would like home health when ready for discharge. Not interested in placement.        VTE Risk Mitigation           Ordered       enoxaparin injection 30 mg Daily    Route: Subcutaneous          03/27/17 0818       Medium Risk of VTE Once      03/27/17 0818       Place sequential compression device Until discontinued      03/27/17 0818       Place YON hose Until discontinued      03/27/17 0818            Sisi Salas MD  Department of Hospital Medicine   [facility]

## 2022-06-27 NOTE — NURSING
Pt is very agitated; trying to pull out IV and pull table onto herself. Dr. Sullivan notified and order placed for IM Zyprexa. Will continue to monitor pt closely.    Tetracycline Pregnancy And Lactation Text: This medication is Pregnancy Category D and not consider safe during pregnancy. It is also excreted in breast milk.

## 2023-01-02 NOTE — ASSESSMENT & PLAN NOTE
MA should be Fine  BP and Pulse-have her sit for 20 minutes before checking   Secondary to pneumonia, will monitor.

## 2024-01-25 NOTE — ASSESSMENT & PLAN NOTE
Pt can do transfers but not really able to walk, PT/OT. Family would like home health when ready for discharge. Not interested in placement.      (normal spontaneous vaginal delivery)